# Patient Record
Sex: FEMALE | Race: WHITE | NOT HISPANIC OR LATINO
[De-identification: names, ages, dates, MRNs, and addresses within clinical notes are randomized per-mention and may not be internally consistent; named-entity substitution may affect disease eponyms.]

---

## 2018-01-23 PROBLEM — Z00.00 ENCOUNTER FOR PREVENTIVE HEALTH EXAMINATION: Status: ACTIVE | Noted: 2018-01-23

## 2018-01-30 ENCOUNTER — APPOINTMENT (OUTPATIENT)
Dept: ENDOCRINOLOGY | Facility: CLINIC | Age: 38
End: 2018-01-30
Payer: MEDICAID

## 2018-01-30 VITALS
SYSTOLIC BLOOD PRESSURE: 101 MMHG | HEART RATE: 69 BPM | DIASTOLIC BLOOD PRESSURE: 70 MMHG | WEIGHT: 136 LBS | HEIGHT: 64 IN | BODY MASS INDEX: 23.22 KG/M2

## 2018-01-30 PROCEDURE — 99204 OFFICE O/P NEW MOD 45 MIN: CPT

## 2018-01-30 RX ORDER — THYROID,PORK 81.25 MG
81.25 TABLET ORAL
Refills: 0 | Status: DISCONTINUED | COMMUNITY
End: 2018-01-30

## 2018-02-12 ENCOUNTER — MOBILE ON CALL (OUTPATIENT)
Age: 38
End: 2018-02-12

## 2018-02-15 ENCOUNTER — TRANSCRIPTION ENCOUNTER (OUTPATIENT)
Age: 38
End: 2018-02-15

## 2018-02-23 ENCOUNTER — APPOINTMENT (OUTPATIENT)
Dept: RHEUMATOLOGY | Facility: CLINIC | Age: 38
End: 2018-02-23

## 2018-06-12 ENCOUNTER — RX RENEWAL (OUTPATIENT)
Age: 38
End: 2018-06-12

## 2019-02-07 ENCOUNTER — APPOINTMENT (OUTPATIENT)
Dept: GASTROENTEROLOGY | Facility: CLINIC | Age: 39
End: 2019-02-07

## 2019-03-08 ENCOUNTER — APPOINTMENT (OUTPATIENT)
Dept: GASTROENTEROLOGY | Facility: CLINIC | Age: 39
End: 2019-03-08

## 2019-03-14 ENCOUNTER — MED ADMIN CHARGE (OUTPATIENT)
Age: 39
End: 2019-03-14

## 2019-04-11 ENCOUNTER — APPOINTMENT (OUTPATIENT)
Dept: GASTROENTEROLOGY | Facility: CLINIC | Age: 39
End: 2019-04-11

## 2019-05-01 ENCOUNTER — APPOINTMENT (OUTPATIENT)
Dept: ENDOCRINOLOGY | Facility: CLINIC | Age: 39
End: 2019-05-01

## 2019-06-03 ENCOUNTER — RX RENEWAL (OUTPATIENT)
Age: 39
End: 2019-06-03

## 2019-06-07 ENCOUNTER — APPOINTMENT (OUTPATIENT)
Dept: GASTROENTEROLOGY | Facility: CLINIC | Age: 39
End: 2019-06-07
Payer: MEDICAID

## 2019-06-07 VITALS
BODY MASS INDEX: 23.56 KG/M2 | OXYGEN SATURATION: 99 % | RESPIRATION RATE: 15 BRPM | TEMPERATURE: 98.3 F | SYSTOLIC BLOOD PRESSURE: 106 MMHG | HEART RATE: 60 BPM | DIASTOLIC BLOOD PRESSURE: 60 MMHG | HEIGHT: 64 IN | WEIGHT: 138 LBS

## 2019-06-07 DIAGNOSIS — K63.89 OTHER SPECIFIED DISEASES OF INTESTINE: ICD-10-CM

## 2019-06-07 DIAGNOSIS — R19.5 OTHER FECAL ABNORMALITIES: ICD-10-CM

## 2019-06-07 DIAGNOSIS — K63.5 POLYP OF COLON: ICD-10-CM

## 2019-06-07 DIAGNOSIS — Z87.19 PERSONAL HISTORY OF OTHER DISEASES OF THE DIGESTIVE SYSTEM: ICD-10-CM

## 2019-06-07 DIAGNOSIS — L29.0 PRURITUS ANI: ICD-10-CM

## 2019-06-07 DIAGNOSIS — E73.9 LACTOSE INTOLERANCE, UNSPECIFIED: ICD-10-CM

## 2019-06-07 PROCEDURE — 99204 OFFICE O/P NEW MOD 45 MIN: CPT

## 2019-06-07 PROCEDURE — 99205 OFFICE O/P NEW HI 60 MIN: CPT

## 2019-06-07 RX ORDER — SODIUM SULFATE, POTASSIUM SULFATE, MAGNESIUM SULFATE 17.5; 3.13; 1.6 G/ML; G/ML; G/ML
17.5-3.13-1.6 SOLUTION, CONCENTRATE ORAL
Qty: 1 | Refills: 0 | Status: ACTIVE | COMMUNITY
Start: 2019-06-07 | End: 1900-01-01

## 2019-06-07 NOTE — ASSESSMENT
[FreeTextEntry1] : Chronic loose stools, somewhat improved recently, cannot rule out lingering celiac disease activity, cannot rule out colitis, microscopic colitis\par Likely strong component of food sensitivity, irritable bowel\par History of colon polyps due for surveillance\par Pruritus ani as noted\par \par Plan\par Continue probiotic yogurt\par Consider Imodium daily to help form of bowel movements, decreased leakage and ideally decrease itching\par Indications risks benefits and alternatives to both upper endoscopy and colonoscopy reviewed\par Patient agreeable to examination

## 2019-06-07 NOTE — REASON FOR VISIT
[Consultation] : a consultation visit [FreeTextEntry1] : History of colon adenoma\par History of celiac disease\par Pruritus ani\par Loose stools

## 2019-06-07 NOTE — PHYSICAL EXAM
[General Appearance - Alert] : alert [General Appearance - In No Acute Distress] : in no acute distress [PERRL With Normal Accommodation] : pupils were equal in size, round, and reactive to light [Sclera] : the sclera and conjunctiva were normal [Extraocular Movements] : extraocular movements were intact [Oropharynx] : the oropharynx was normal [Outer Ear] : the ears and nose were normal in appearance [Neck Appearance] : the appearance of the neck was normal [Neck Cervical Mass (___cm)] : no neck mass was observed [Thyroid Diffuse Enlargement] : the thyroid was not enlarged [Jugular Venous Distention Increased] : there was no jugular-venous distention [Thyroid Nodule] : there were no palpable thyroid nodules [Auscultation Breath Sounds / Voice Sounds] : lungs were clear to auscultation bilaterally [Heart Rate And Rhythm] : heart rate was normal and rhythm regular [Murmurs] : no murmurs [Heart Sounds Gallop] : no gallops [Heart Sounds] : normal S1 and S2 [Heart Sounds Pericardial Friction Rub] : no pericardial rub [Bowel Sounds] : normal bowel sounds [Abdomen Soft] : soft [Abdomen Tenderness] : non-tender [Abdomen Mass (___ Cm)] : no abdominal mass palpated [Cervical Lymph Nodes Enlarged Posterior Bilaterally] : posterior cervical [Supraclavicular Lymph Nodes Enlarged Bilaterally] : supraclavicular [Cervical Lymph Nodes Enlarged Anterior Bilaterally] : anterior cervical [Axillary Lymph Nodes Enlarged Bilaterally] : axillary [Femoral Lymph Nodes Enlarged Bilaterally] : femoral [No CVA Tenderness] : no ~M costovertebral angle tenderness [Inguinal Lymph Nodes Enlarged Bilaterally] : inguinal [No Spinal Tenderness] : no spinal tenderness [Abnormal Walk] : normal gait [Musculoskeletal - Swelling] : no joint swelling seen [Nail Clubbing] : no clubbing  or cyanosis of the fingernails [Skin Color & Pigmentation] : normal skin color and pigmentation [Skin Turgor] : normal skin turgor [Motor Tone] : muscle strength and tone were normal [Oriented To Time, Place, And Person] : oriented to person, place, and time [] : no rash [Affect] : the affect was normal [Impaired Insight] : insight and judgment were intact [External Hemorrhoid] : no external hemorrhoids

## 2019-06-07 NOTE — HISTORY OF PRESENT ILLNESS
[de-identified] : 38-year-old female history of celiac disease percent for evaluation.\par Patient diagnosed in her 20s, complaints of bloating, constipation markedly improved following diagnosis and strict gluten-free diet\par \par Last upper endoscopy performed 2015 showed grossly normal-appearing duodenal mucosa, with intraepithelial lymphocytosis. No treatment changes recommended at that time.\par \par Patient had colonoscopy performed at the same time does not examination for evaluation of bleeding, noted to have tubular adenoma.\par \par Patient's most recent gastrointestinal complaints include anal itching for which she has seen several dermatologists, proctologist\par No definitive explanation provided, the patient feels complaints likely related to anal leakage\par States that whenever she goes to wipe, even if she has not just had a bowel movement, there is typically stool on toilet tissue\par Patient denies any fecal soiling of her undergarments\par Patient denies any incontinence\par \par Patient gives history of lactose intolerance\par Also history of small bowel bacterial overgrowth syndrome, no attempt to treat with antibiotics\par \par Patient has used FODMAP diet in the past with some success\par Currently on a Paleo diet, since developing intolerances to many grains, other foods\par \par Weight stable\par Recent improvement of bowel habit with feeding yogurt\par \par Social history nonsmoker, professional musician

## 2019-06-12 ENCOUNTER — MESSAGE (OUTPATIENT)
Age: 39
End: 2019-06-12

## 2019-06-12 RX ORDER — POLYETHYLENE GLYCOL-3350 AND ELECTROLYTES WITH FLAVOR PACK 240; 5.84; 2.98; 6.72; 22.72 G/278.26G; G/278.26G; G/278.26G; G/278.26G; G/278.26G
240 POWDER, FOR SOLUTION ORAL
Qty: 1 | Refills: 0 | Status: ACTIVE | COMMUNITY
Start: 2019-06-12 | End: 1900-01-01

## 2019-07-19 ENCOUNTER — APPOINTMENT (OUTPATIENT)
Dept: ENDOCRINOLOGY | Facility: CLINIC | Age: 39
End: 2019-07-19

## 2019-07-25 ENCOUNTER — APPOINTMENT (OUTPATIENT)
Dept: ENDOCRINOLOGY | Facility: CLINIC | Age: 39
End: 2019-07-25
Payer: MEDICAID

## 2019-07-25 VITALS
DIASTOLIC BLOOD PRESSURE: 72 MMHG | HEART RATE: 84 BPM | WEIGHT: 137 LBS | SYSTOLIC BLOOD PRESSURE: 104 MMHG | BODY MASS INDEX: 23.39 KG/M2 | HEIGHT: 64 IN

## 2019-07-25 PROCEDURE — 99213 OFFICE O/P EST LOW 20 MIN: CPT

## 2019-07-25 RX ORDER — THYROID, PORCINE 15 MG/1
15 TABLET ORAL
Qty: 90 | Refills: 0 | Status: DISCONTINUED | COMMUNITY
Start: 2018-02-08 | End: 2019-07-25

## 2019-08-19 RX ORDER — THYROID, PORCINE 90 MG/1
90 TABLET ORAL
Qty: 90 | Refills: 1 | Status: DISCONTINUED | COMMUNITY
Start: 2018-02-08 | End: 2019-08-19

## 2019-08-19 NOTE — ADDENDUM
[FreeTextEntry1] : 8/19/19 AL\par Pt called having trouble cutting pills. Would like to return to 15mg and 60mg pills. Rx sent.

## 2019-08-19 NOTE — ASSESSMENT
[FreeTextEntry1] : Hypothyroidism (244.9) (E03.9)\par \par Hypothyroidism. TSH is normal with slightly low FT4 and normal FT3 on Mcpherson thyroid 67.5mg daily. We discussed switching to T4 + T3 therapy with Tirosint and Cytomel (both gluten free per the ; would consider starting levothyroxine 100 mcg and liothyronine 10 mcg) in the future, as she will be travelling soon and doesn't know how she will respond to change. She is requesting 90mg tabs so she doesn't have to carry multiple pill bottles and pay 2 co-pays. She has pill cutter and will cut in half and the one half in half. If she has difficulty cutting for accurate dose, she will let me know.\par \par She will fax thyroid US results. \par Provided lab requisition for 3 months from now to recheck TFTS. F/U with Dr Avendano in 3 months.\par

## 2019-08-19 NOTE — HISTORY OF PRESENT ILLNESS
[FreeTextEntry1] : Ms. Tim is a 37 year-old woman with a history of celiac disease and hypothyroidism presenting for f/u of hypothyroidism. She is a pt of Dr Avendano last seen over a year ago.\par \par Hypothyroidism.\par She was diagnosed in 2000.\par Last visit she was prescribed Malone thyroid 75 mg daily. She is only taking 67.5 mg daily as she cuts the 15 mg pill in half. She says if the pill is not cut equally, she can feel effects of a little more medication "wired and trouble sleeping". She takes it in the morning, on an empty stomach, with plain water, and waits 1 hour before eating.\par She brought labs from May: TSH 0.70, FT4 0.7, TT4 5.1, TT3 29, FT3 3.3.\par Current complaints: loose stools, cold and heat intolerance, gained 10lbs last year due to increase in food and lack of exercise, but is now back at baseline. Is basically on paleo diet. Lactose intolerant.\par Previously she was taking WP Thyroid for about 3 years, at the 81.25 mg dose since about September 2017. WP Thyroid had been very difficult to obtain from pharmacies. Prior to that, she was previously on brand-name Synthroid and had subsequently felt much better since the switch to a natural formulation.\par No history of head/neck radiation.\par She had a thyroid US approx year and a half ago. Thinks she remembers thyroid being small. Doesn't recall nodules.\par Her paternal aunt has a history of hypothyroidism.\par \par She takes the following supplements: homocysteine supreme, digestive enzymes, fish oil, probiotics, Vitamin C and D.\par She recently went to GI for f/u on celiac and pruritic ani who recommended probiotic yogurt and daily Imodium.

## 2019-08-19 NOTE — PHYSICAL EXAM
[Alert] : alert [No Acute Distress] : no acute distress [Well Nourished] : well nourished [Well Developed] : well developed [Normal Sclera/Conjunctiva] : normal sclera/conjunctiva [EOMI] : extra ocular movement intact [No Proptosis] : no proptosis [Normal Oropharynx] : the oropharynx was normal [Thyroid Not Enlarged] : the thyroid was not enlarged [No Thyroid Nodules] : there were no palpable thyroid nodules [No Respiratory Distress] : no respiratory distress [No Accessory Muscle Use] : no accessory muscle use [Clear to Auscultation] : lungs were clear to auscultation bilaterally [Normal Rate] : heart rate was normal  [Normal S1, S2] : normal S1 and S2 [Regular Rhythm] : with a regular rhythm [Pedal Pulses Normal] : the pedal pulses are present [No Edema] : there was no peripheral edema [Normal Bowel Sounds] : normal bowel sounds [Not Tender] : non-tender [Soft] : abdomen soft [Not Distended] : not distended [Post Cervical Nodes] : posterior cervical nodes [Anterior Cervical Nodes] : anterior cervical nodes [Axillary Nodes] : axillary nodes [Normal] : normal and non tender [No Spinal Tenderness] : no spinal tenderness [Spine Straight] : spine straight [No Stigmata of Cushings Syndrome] : no stigmata of cushings syndrome [Normal Gait] : normal gait [Normal Strength/Tone] : muscle strength and tone were normal [No Rash] : no rash [No Skin Lesions] : no skin lesions [No Tremors] : no tremors [Oriented x3] : oriented to person, place, and time [Normal Insight/Judgement] : insight and judgment were intact [Normal Affect] : the affect was normal [Normal Mood] : the mood was normal [Acanthosis Nigricans] : no acanthosis nigricans

## 2019-08-27 ENCOUNTER — RX CHANGE (OUTPATIENT)
Age: 39
End: 2019-08-27

## 2019-08-29 ENCOUNTER — TRANSCRIPTION ENCOUNTER (OUTPATIENT)
Age: 39
End: 2019-08-29

## 2020-07-30 ENCOUNTER — APPOINTMENT (OUTPATIENT)
Dept: ENDOCRINOLOGY | Facility: CLINIC | Age: 40
End: 2020-07-30
Payer: MEDICAID

## 2020-07-30 PROCEDURE — 99213 OFFICE O/P EST LOW 20 MIN: CPT | Mod: 95

## 2020-07-30 NOTE — REASON FOR VISIT
[Home] : at home, [unfilled] , at the time of the visit. [Medical Office: ([facility])___] : at the medical office located in  [Hypothyroidism] : hypothyroidism [Follow - Up] : a follow-up visit [Verbal consent obtained from patient] : the patient, [unfilled]

## 2020-07-30 NOTE — PHYSICAL EXAM
[Alert] : alert [Well Nourished] : well nourished [No Acute Distress] : no acute distress [Healthy Appearance] : healthy appearance [Normal Voice/Communication] : normal voice communication

## 2020-08-15 ENCOUNTER — TRANSCRIPTION ENCOUNTER (OUTPATIENT)
Age: 40
End: 2020-08-15

## 2020-08-28 ENCOUNTER — TRANSCRIPTION ENCOUNTER (OUTPATIENT)
Age: 40
End: 2020-08-28

## 2020-08-28 ENCOUNTER — EMERGENCY (EMERGENCY)
Facility: HOSPITAL | Age: 40
LOS: 1 days | Discharge: HOME | End: 2020-08-28
Admitting: EMERGENCY MEDICINE
Payer: MEDICAID

## 2020-08-28 VITALS
HEART RATE: 70 BPM | SYSTOLIC BLOOD PRESSURE: 109 MMHG | WEIGHT: 149.91 LBS | OXYGEN SATURATION: 98 % | HEIGHT: 67 IN | TEMPERATURE: 98 F | DIASTOLIC BLOOD PRESSURE: 74 MMHG | RESPIRATION RATE: 17 BRPM
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
## 2020-08-28 DIAGNOSIS — S61.432A PUNCTURE WOUND WITHOUT FOREIGN BODY OF LEFT HAND, INITIAL ENCOUNTER: ICD-10-CM
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
## 2020-08-28 PROCEDURE — 99284 EMERGENCY DEPT VISIT MOD MDM: CPT

## 2020-08-28 NOTE — ED ADULT NURSE NOTE - NSIMPLEMENTINTERV_GEN_ALL_ED
Implemented All Universal Safety Interventions:  [place] to call system. Call bell, personal items and telephone within reach. Instruct patient to call for assistance. Room bathroom lighting operational. Non-slip footwear when patient is off stretcher. Physically safe environment: no spills, clutter or unnecessary equipment. Stretcher in lowest position, wheels locked, appropriate side rails in place.

## 2020-08-28 NOTE — ED ADULT TRIAGE NOTE - CHIEF COMPLAINT QUOTE
patient walk in with multiple cat scratches to bilateral upper and lower extremities; unsure of any bites; [facility] and bandaged

## 2020-08-28 NOTE — ED ADULT NURSE NOTE - OBJECTIVE STATEMENT
40y female presents to ED c/o animal scratches/bite. Pt states she is cat sitting for the cats of an acquaintance. Tonight cat lunged at her multiple times, pt unclear if she was bit. Sustained lacerations to bilateral arms, legs, and chest. Pt presented to [facility] first and has bandages applied to lacerations. Owner of cat unsure if cat has been vaccinated. Pt not up to date on tetanus vax. A&Ox4.

## 2020-08-29 ENCOUNTER — EMERGENCY (EMERGENCY)
Facility: HOSPITAL | Age: 40
LOS: 1 days | Discharge: HOME | End: 2020-08-29
Attending: EMERGENCY MEDICINE | Admitting: EMERGENCY MEDICINE
Payer: MEDICAID

## 2020-08-29 VITALS
SYSTOLIC BLOOD PRESSURE: 108 MMHG | HEIGHT: 67 IN | WEIGHT: 138.01 LBS | DIASTOLIC BLOOD PRESSURE: 64 MMHG | OXYGEN SATURATION: 96 % | TEMPERATURE: 99 F | RESPIRATION RATE: 16 BRPM | HEART RATE: 94 BPM

## 2020-08-29 VITALS
DIASTOLIC BLOOD PRESSURE: 72 MMHG | OXYGEN SATURATION: 98 % | RESPIRATION RATE: 18 BRPM | SYSTOLIC BLOOD PRESSURE: 119 MMHG | TEMPERATURE: 99 F | HEART RATE: 86 BPM

## 2020-08-29 VITALS
TEMPERATURE: 99 F | OXYGEN SATURATION: 99 % | SYSTOLIC BLOOD PRESSURE: 110 MMHG | RESPIRATION RATE: 18 BRPM | DIASTOLIC BLOOD PRESSURE: 60 MMHG | HEART RATE: 92 BPM

## 2020-08-29 LAB
ALBUMIN SERPL ELPH-MCNC: 4.3 G/DL — SIGNIFICANT CHANGE UP (ref 3.3–5)
ALP SERPL-CCNC: 54 U/L — SIGNIFICANT CHANGE UP (ref 40–120)
ALT FLD-CCNC: 10 U/L — SIGNIFICANT CHANGE UP (ref 10–45)
ANION GAP SERPL CALC-SCNC: 11 MMOL/L — SIGNIFICANT CHANGE UP (ref 5–17)
APTT BLD: 29.1 SEC — SIGNIFICANT CHANGE UP (ref 27.5–35.5)
AST SERPL-CCNC: 18 U/L — SIGNIFICANT CHANGE UP (ref 10–40)
BASOPHILS # BLD AUTO: 0.01 K/UL — SIGNIFICANT CHANGE UP (ref 0–0.2)
BASOPHILS NFR BLD AUTO: 0.1 % — SIGNIFICANT CHANGE UP (ref 0–2)
BILIRUB SERPL-MCNC: 0.6 MG/DL — SIGNIFICANT CHANGE UP (ref 0.2–1.2)
BUN SERPL-MCNC: 5 MG/DL — LOW (ref 7–23)
CALCIUM SERPL-MCNC: 9.4 MG/DL — SIGNIFICANT CHANGE UP (ref 8.4–10.5)
CHLORIDE SERPL-SCNC: 98 MMOL/L — SIGNIFICANT CHANGE UP (ref 96–108)
CO2 SERPL-SCNC: 27 MMOL/L — SIGNIFICANT CHANGE UP (ref 22–31)
CREAT SERPL-MCNC: 0.62 MG/DL — SIGNIFICANT CHANGE UP (ref 0.5–1.3)
EOSINOPHIL # BLD AUTO: 0.01 K/UL — SIGNIFICANT CHANGE UP (ref 0–0.5)
EOSINOPHIL NFR BLD AUTO: 0.1 % — SIGNIFICANT CHANGE UP (ref 0–6)
GLUCOSE SERPL-MCNC: 113 MG/DL — HIGH (ref 70–99)
GRAM STN FLD: SIGNIFICANT CHANGE UP
HCG SERPL-ACNC: <0 MIU/ML — SIGNIFICANT CHANGE UP
HCT VFR BLD CALC: 37.7 % — SIGNIFICANT CHANGE UP (ref 34.5–45)
HGB BLD-MCNC: 12.5 G/DL — SIGNIFICANT CHANGE UP (ref 11.5–15.5)
IMM GRANULOCYTES NFR BLD AUTO: 0.3 % — SIGNIFICANT CHANGE UP (ref 0–1.5)
INR BLD: 1.09 — SIGNIFICANT CHANGE UP (ref 0.88–1.16)
LYMPHOCYTES # BLD AUTO: 1.26 K/UL — SIGNIFICANT CHANGE UP (ref 1–3.3)
LYMPHOCYTES # BLD AUTO: 12.5 % — LOW (ref 13–44)
MCHC RBC-ENTMCNC: 31.7 PG — SIGNIFICANT CHANGE UP (ref 27–34)
MCHC RBC-ENTMCNC: 33.2 GM/DL — SIGNIFICANT CHANGE UP (ref 32–36)
MCV RBC AUTO: 95.7 FL — SIGNIFICANT CHANGE UP (ref 80–100)
MONOCYTES # BLD AUTO: 0.63 K/UL — SIGNIFICANT CHANGE UP (ref 0–0.9)
MONOCYTES NFR BLD AUTO: 6.3 % — SIGNIFICANT CHANGE UP (ref 2–14)
NEUTROPHILS # BLD AUTO: 8.1 K/UL — HIGH (ref 1.8–7.4)
NEUTROPHILS NFR BLD AUTO: 80.7 % — HIGH (ref 43–77)
NRBC # BLD: 0 /100 WBCS — SIGNIFICANT CHANGE UP (ref 0–0)
PLATELET # BLD AUTO: 221 K/UL — SIGNIFICANT CHANGE UP (ref 150–400)
POTASSIUM SERPL-MCNC: 3.7 MMOL/L — SIGNIFICANT CHANGE UP (ref 3.5–5.3)
POTASSIUM SERPL-SCNC: 3.7 MMOL/L — SIGNIFICANT CHANGE UP (ref 3.5–5.3)
PROT SERPL-MCNC: 7.4 G/DL — SIGNIFICANT CHANGE UP (ref 6–8.3)
PROTHROM AB SERPL-ACNC: 13 SEC — SIGNIFICANT CHANGE UP (ref 10.6–13.6)
RBC # BLD: 3.94 M/UL — SIGNIFICANT CHANGE UP (ref 3.8–5.2)
RBC # FLD: 11.8 % — SIGNIFICANT CHANGE UP (ref 10.3–14.5)
SODIUM SERPL-SCNC: 136 MMOL/L — SIGNIFICANT CHANGE UP (ref 135–145)
SPECIMEN SOURCE: SIGNIFICANT CHANGE UP
WBC # BLD: 10.04 K/UL — SIGNIFICANT CHANGE UP (ref 3.8–10.5)
WBC # FLD AUTO: 10.04 K/UL — SIGNIFICANT CHANGE UP (ref 3.8–10.5)

## 2020-08-29 PROCEDURE — 96374 THER/PROPH/DIAG INJ IV PUSH: CPT

## 2020-08-29 PROCEDURE — 73130 X-RAY EXAM OF HAND: CPT | Mod: 26,LT

## 2020-08-29 PROCEDURE — 99284 EMERGENCY DEPT VISIT MOD MDM: CPT | Mod: 25

## 2020-08-29 PROCEDURE — 86901 BLOOD TYPING SEROLOGIC RH(D): CPT

## 2020-08-29 PROCEDURE — 86850 RBC ANTIBODY SCREEN: CPT

## 2020-08-29 PROCEDURE — 36415 COLL VENOUS BLD VENIPUNCTURE: CPT

## 2020-08-29 PROCEDURE — 73590 X-RAY EXAM OF LOWER LEG: CPT | Mod: 26,LT

## 2020-08-29 PROCEDURE — 85730 THROMBOPLASTIN TIME PARTIAL: CPT

## 2020-08-29 PROCEDURE — 80053 COMPREHEN METABOLIC PANEL: CPT

## 2020-08-29 PROCEDURE — 73060 X-RAY EXAM OF HUMERUS: CPT

## 2020-08-29 PROCEDURE — 85610 PROTHROMBIN TIME: CPT

## 2020-08-29 PROCEDURE — 99284 EMERGENCY DEPT VISIT MOD MDM: CPT

## 2020-08-29 PROCEDURE — 73060 X-RAY EXAM OF HUMERUS: CPT | Mod: 26,RT

## 2020-08-29 PROCEDURE — 87070 CULTURE OTHR SPECIMN AEROBIC: CPT

## 2020-08-29 PROCEDURE — 73130 X-RAY EXAM OF HAND: CPT

## 2020-08-29 PROCEDURE — 87205 SMEAR GRAM STAIN: CPT

## 2020-08-29 PROCEDURE — 73590 X-RAY EXAM OF LOWER LEG: CPT

## 2020-08-29 PROCEDURE — 85025 COMPLETE CBC W/AUTO DIFF WBC: CPT

## 2020-08-29 PROCEDURE — 87184 SC STD DISK METHOD PER PLATE: CPT

## 2020-08-29 PROCEDURE — 84702 CHORIONIC GONADOTROPIN TEST: CPT

## 2020-08-29 RX ORDER — AMPICILLIN SODIUM AND SULBACTAM SODIUM 250; 125 MG/ML; MG/ML
3 INJECTION, POWDER, FOR SUSPENSION INTRAMUSCULAR; INTRAVENOUS ONCE
Refills: 0 | Status: COMPLETED | OUTPATIENT
Start: 2020-08-29 | End: 2020-08-29

## 2020-08-29 RX ORDER — TETANUS TOXOID, REDUCED DIPHTHERIA TOXOID AND ACELLULAR PERTUSSIS VACCINE, ADSORBED 5; 2.5; 8; 8; 2.5 [IU]/.5ML; [IU]/.5ML; UG/.5ML; UG/.5ML; UG/.5ML
0.5 SUSPENSION INTRAMUSCULAR ONCE
Refills: 0 | Status: COMPLETED | OUTPATIENT
Start: 2020-08-29 | End: 2020-08-29

## 2020-08-29 RX ORDER — OXYCODONE AND ACETAMINOPHEN 5; 325 MG/1; MG/1
1 TABLET ORAL ONCE
Refills: 0 | Status: DISCONTINUED | OUTPATIENT
Start: 2020-08-29 | End: 2020-08-29

## 2020-08-29 RX ORDER — HUMAN RABIES VIRUS IMMUNE GLOBULIN 150 [IU]/ML
1350 INJECTION, SOLUTION INTRAMUSCULAR ONCE
Refills: 0 | Status: COMPLETED | OUTPATIENT
Start: 2020-08-29 | End: 2020-08-29

## 2020-08-29 RX ORDER — RABIES VACC, HUMAN DIPLOID/PF 2.5 UNIT
1 VIAL (EA) INTRAMUSCULAR ONCE
Refills: 0 | Status: COMPLETED | OUTPATIENT
Start: 2020-08-29 | End: 2020-08-29

## 2020-08-29 RX ADMIN — HUMAN RABIES VIRUS IMMUNE GLOBULIN 1350 UNIT(S): 150 INJECTION, SOLUTION INTRAMUSCULAR at 02:30

## 2020-08-29 RX ADMIN — Medication 1 MILLILITER(S): at 00:50

## 2020-08-29 RX ADMIN — AMPICILLIN SODIUM AND SULBACTAM SODIUM 200 GRAM(S): 250; 125 INJECTION, POWDER, FOR SUSPENSION INTRAMUSCULAR; INTRAVENOUS at 06:57

## 2020-08-29 RX ADMIN — OXYCODONE AND ACETAMINOPHEN 1 TABLET(S): 5; 325 TABLET ORAL at 05:00

## 2020-08-29 RX ADMIN — OXYCODONE AND ACETAMINOPHEN 1 TABLET(S): 5; 325 TABLET ORAL at 08:03

## 2020-08-29 RX ADMIN — AMPICILLIN SODIUM AND SULBACTAM SODIUM 200 GRAM(S): 250; 125 INJECTION, POWDER, FOR SUSPENSION INTRAMUSCULAR; INTRAVENOUS at 14:00

## 2020-08-29 RX ADMIN — Medication 0.5 MILLIGRAM(S): at 00:49

## 2020-08-29 RX ADMIN — OXYCODONE AND ACETAMINOPHEN 1 TABLET(S): 5; 325 TABLET ORAL at 14:03

## 2020-08-29 RX ADMIN — OXYCODONE AND ACETAMINOPHEN 1 TABLET(S): 5; 325 TABLET ORAL at 15:03

## 2020-08-29 RX ADMIN — AMPICILLIN SODIUM AND SULBACTAM SODIUM 200 GRAM(S): 250; 125 INJECTION, POWDER, FOR SUSPENSION INTRAMUSCULAR; INTRAVENOUS at 00:51

## 2020-08-29 RX ADMIN — Medication 1 MILLIGRAM(S): at 02:23

## 2020-08-29 RX ADMIN — TETANUS TOXOID, REDUCED DIPHTHERIA TOXOID AND ACELLULAR PERTUSSIS VACCINE, ADSORBED 0.5 MILLILITER(S): 5; 2.5; 8; 8; 2.5 SUSPENSION INTRAMUSCULAR at 00:51

## 2020-08-29 NOTE — ED PROVIDER NOTE - CARE PROVIDER_API CALL
Emiliano Mcfarlane  PLASTIC SURGERY  [address] [place] [address]  Phone: [phone]  Fax: [phone]  Follow Up Time: Urgent

## 2020-08-29 NOTE — ED PROCEDURE NOTE - PROCEDURE ADDITIONAL DETAILS
Patient will cat scratches and puncture wounds all over body. multiple abrasions/scratches. All cleansed with povidone iodine and dressed  One deep punture wound left hand base on fourth digit on dorsal aspect of hand lateral to MCP joint. Extensively cleansed,irrigated and cleaned. Probed for FB.   right posterior mild calf puncture wound extensively cleansed and irrigated. Probed and explored for FB.   All wound extensively cleaned with povidone iodine and then sterile water. No signs of FB.

## 2020-08-29 NOTE — ED PROVIDER NOTE - OBJECTIVE STATEMENT
Patient is a 40 year old female with PMH of hypothyroidism presents today without wound on her left hand, arms, and legs from being attacked by a cat last night. Pt was cat sitting for a friend when the cat attacked her. She was originally seen at [facility] and given two doses of Unison over 6 hour period. She was referred to Dr. Mcfarlane in plastics who sent patient to ER for hand surgeon for concern of puncture wound just proximal to left second finger with swelling. Tetanus was updated. Pt denies fever or chills, numbness, and tingling.

## 2020-08-29 NOTE — ED ADULT NURSE NOTE - NSIMPLEMENTINTERV_GEN_ALL_ED
Implemented All Universal Safety Interventions:  [place] to call system. Call bell, personal items and telephone within reach. Instruct patient to call for assistance. Room bathroom lighting operational. Non-slip footwear when patient is off stretcher. Physically safe environment: no spills, clutter or unnecessary equipment. Stretcher in lowest position, wheels locked, appropriate side rails in place.

## 2020-08-29 NOTE — ED ADULT NURSE NOTE - OBJECTIVE STATEMENT
Pt is a 40y female, was seen at [facility] s/p being attacked by a cat. Pt was sent to [facility] ED for further evaluation and to see a hand specialist.

## 2020-08-29 NOTE — ED ADULT NURSE NOTE - CHIEF COMPLAINT QUOTE
pt states " I was sent here by MD Mcfarlane to see a hand specialist, Dr Vazquez, also they told me i'm starting to have cellulitis in my leg". pt endorsed ' she was attack by a cat, sustained multiple lacerations to arms, legs, back.

## 2020-08-29 NOTE — ED ADULT TRIAGE NOTE - CHIEF COMPLAINT QUOTE
pt states " I was sent here by MD Mcfarlane to see a hand specialist, Dr Vazquez". pt endorsed ' she was attack by a cat, sustained multiple lacerations to arms, legs, back. pt states " I was sent here by MD Mcfarlane to see a hand specialist, Dr Vazquez, also they told me i'm starting to have cellulitis in my leg". pt endorsed ' she was attack by a cat, sustained multiple lacerations to arms, legs, back.

## 2020-08-29 NOTE — ED PROVIDER NOTE - ATTENDING CONTRIBUTION TO CARE
Patient is a 40 year old female with PMH of hypothyroidism presents today without wound on her left hand, arms, and legs from being attacked by a cat last night. Pt was cat sitting for a friend when the cat attacked her. She was originally seen at [facility] and given two doses of Unison over 6 hour period. She was referred to Dr. Mcfarlane in plastics who sent patient to ER for hand surgeon for concern of puncture wound just proximal to left second finger with swelling. Tetanus was updated. Pt denies fever or chills, numbness, and tingling.    Agree with HPI and PE as documented by PA    Pt with multiple abrasions and cat bite wounds to extremities  No gas on xrays  Hand wound addressed by plastics hand in ED - discharged on antibiotics

## 2020-08-29 NOTE — ED ADULT NURSE NOTE - CAS EDN DISCHARGE ASSESSMENT
The patient is a 74y Female complaining of other (specify).
Alert and oriented to person, place and time

## 2020-08-29 NOTE — ED PROVIDER NOTE - OBJECTIVE STATEMENT
41 y/o female with hx of crohns now here s/p being attacked multiple times by a Cat. Patient states she was house sitting for a cat of a friend and the cat attacked her unprovoked. Patients friend called and states ct does not usually attack anyone and not to that extent. Patients friend unsure if Cat has rabies vaccinations. Patient denies chest pain,nausea,vomiting,diarrhea,fevers,chills,sob,trauma,loc. Patient appears well NAD.

## 2020-08-29 NOTE — CONSULT NOTE ADULT - SUBJECTIVE AND OBJECTIVE BOX
41yo RHD female who was babysitting a cat for an aquaintance yesterday night when the cat became aggressive and bit and scratched her. The neighbors heard her having problems and brought her to an urgent care in [place]. Prior to arriving at the urgent care she did try to clean her scratches out with hydrogen peroxide. She was then brought to the [facility] ED where she was given IV abx and advised to follow up with Plastic surgeon Emiliano luna. Dr. Luna had then referred her to the Ed for hand surgery evaluation. She denies any fevers or chills. 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 Laceration present in distal to her index finger MCP and ulnar to it. Laceration measures 1cm in size, sensation intact in all distributions and all fingers. Edema also present in the volar aspect of her MCP. No erythema in her volar aspect. No tenderness along the flexor tendon sheath. Pt with FROM of all fingers and wrist. Pt with full extension of her MCP. fingers pink and warm. No tenderness to palpation in her wrist, no cellulitis in her wrist or proximally    Xray of the left hand: Not interpreted formally by radiology however no evidence of fracture or dislocation, no evidence of foreign body                          12.5   10.04 )-----------( 221      ( 29 Aug 2020 13:25 )             37.7   08-29    136  |  98  |  5<L>  ----------------------------<  113<H>  3.7   |  27  |  0.62    Ca    9.4      29 Aug 2020 13:25    TPro  7.4  /  Alb  4.3  /  TBili  0.6  /  DBili  x   /  AST  18  /  ALT  10  /  AlkPhos  54  08-29

## 2020-08-29 NOTE — ED PROVIDER NOTE - PATIENT PORTAL LINK FT
You can access the FollowMyHealth Patient Portal offered by [facility] by registering at the following website: [email or web address]. By joining [facility]’s FollowMyHealth portal, you will also be able to view your health information using other applications (apps) compatible with our system.

## 2020-08-29 NOTE — ED PROVIDER NOTE - NSFOLLOWUPINSTRUCTIONS_ED_ALL_ED_FT
GO TO [address] [place] [address]  8 AM Dr. Mcfarlane  at 11 AM THIS MORNING         Follow up with your primary care doctor or clinics listed below if you do not have a doctor  Return immediately for any new or worsening symptoms or any new concerns  Animal Bite, Adult  Animal bites range from mild to serious. An animal bite can result in any of these injuries:  A scratch.A deep, open cut.A puncture of the skin.A crush injury.Tearing away of the skin or a body part.A bone injury.A small bite from a house pet is usually less serious than a bite from a stray or wild animal, such as a raccoon, davis, skunk, or bat. That is because stray and wild animals have a higher risk of carrying a serious infection called rabies, which can be passed to humans through a bite.  What increases the risk?  You are more likely to be bitten by an animal if:  You are around unfamiliar pets.You disturb an animal when it is eating, sleeping, or caring for its babies.You are outdoors in a place where small, wild animals roam freely.What are the signs or symptoms?  Common symptoms of an animal bite include:  Pain.Bleeding.Swelling.Bruising.How is this diagnosed?  This condition may be diagnosed based on a physical exam and medical history. Your health care provider will examine your wound and ask for details about the animal and how the bite happened. You may also have tests, such as:  Blood tests to check for infection.X-rays to check for damage to bones or joints.Taking a fluid sample from your wound and checking it for infection (culture test).How is this treated?  Treatment varies depending on the type of animal, where the bite is on your body, and your medical history. Treatment may include:  Caring for the wound. This often includes cleaning the wound, rinsing out (flushing) the wound with saline solution, and applying a bandage (dressing). In some cases, the wound may be closed with stitches (sutures), staples, skin glue, or adhesive strips.Antibiotic medicine to prevent or treat infection. This medicine may be prescribed in pill or ointment form. If the bite area becomes infected, the medicine may be given through an IV.A tetanus shot to prevent tetanus infection.Rabies treatment to prevent rabies infection. This will be done if the animal could have rabies.Surgery. This may be done if a bite gets infected or if there is damage that needs to be repaired.Follow these instructions at home:  Wound care        Follow instructions from your health care provider about how to take care of your wound. Make sure you:  Wash your hands with soap and water before you change your dressing. If soap and water are not available, use hand .Change your dressing as told by your health care provider.Leave sutures, skin glue, or adhesive strips in place. These skin closures may need to stay in place for 2 weeks or longer. If adhesive strip edges start to loosen and curl up, you may trim the loose edges. Do not remove adhesive strips completely unless your health care provider tells you to do that.Check your wound every day for signs of infection. Check for:  More redness, swelling, or pain.More fluid or blood.Warmth.Pus or a bad smell.Medicines     Take or apply over-the-counter and prescription medicines only as told by your health care provider.If you were prescribed an antibiotic, take or apply it as told by your health care provider. Do not stop using the antibiotic even if your condition improves.General instructions        Keep the injured area raised (elevated) above the level of your heart while you are sitting or lying down, if this is possible.If directed, put ice on the injured area.  Put ice in a plastic bag.Place a towel between your skin and the bag.Leave the ice on for 20 minutes, 2–3 times per day.Keep all follow-up visits as told by your health care provider. This is important.Contact a health care provider if:  You have more redness, swelling, or pain around your wound.Your wound feels warm to the touch.You have a fever or chills.You have a general feeling of sickness (malaise).You feel nauseous or you vomit.You have pain that does not get better.Get help right away if:  You have a red streak that leads away from your wound.You have non-clear fluid or more blood coming from your wound.There is pus or a bad smell coming from your wound.You have trouble moving your injured area.You have numbness or tingling that extends beyond the wound.Summary  Animal bites can range from mild to serious. An animal bite can cause a scratch on the skin, a deep open cut, a puncture of the skin, a crush injury, tearing away of the skin or a body part, or a bone injury.Your health care provider will examine your wound and ask for details about the animal and how the bite happened.You may also have tests such as a blood test, X-ray, or testing of a fluid sample from your wound (culture test).Treatment may include wound care, antibiotic medicine, a tetanus shot, and rabies treatment if the animal could have rabies.This information is not intended to replace advice given to you by your health care provider. Make sure you discuss any questions you have with your health care provider. Return on August 31st, september 4th ,September 11th for rabies vaccine     GO TO [address] [place] [address]  8 AM Dr. Mcfarlane  at 11 AM THIS MORNING         Follow up with your primary care doctor or clinics listed below if you do not have a doctor  Return immediately for any new or worsening symptoms or any new concerns  Animal Bite, Adult  Animal bites range from mild to serious. An animal bite can result in any of these injuries:  A scratch.A deep, open cut.A puncture of the skin.A crush injury.Tearing away of the skin or a body part.A bone injury.A small bite from a house pet is usually less serious than a bite from a stray or wild animal, such as a raccoon, davis, skunk, or bat. That is because stray and wild animals have a higher risk of carrying a serious infection called rabies, which can be passed to humans through a bite.  What increases the risk?  You are more likely to be bitten by an animal if:  You are around unfamiliar pets.You disturb an animal when it is eating, sleeping, or caring for its babies.You are outdoors in a place where small, wild animals roam freely.What are the signs or symptoms?  Common symptoms of an animal bite include:  Pain.Bleeding.Swelling.Bruising.How is this diagnosed?  This condition may be diagnosed based on a physical exam and medical history. Your health care provider will examine your wound and ask for details about the animal and how the bite happened. You may also have tests, such as:  Blood tests to check for infection.X-rays to check for damage to bones or joints.Taking a fluid sample from your wound and checking it for infection (culture test).How is this treated?  Treatment varies depending on the type of animal, where the bite is on your body, and your medical history. Treatment may include:  Caring for the wound. This often includes cleaning the wound, rinsing out (flushing) the wound with saline solution, and applying a bandage (dressing). In some cases, the wound may be closed with stitches (sutures), staples, skin glue, or adhesive strips.Antibiotic medicine to prevent or treat infection. This medicine may be prescribed in pill or ointment form. If the bite area becomes infected, the medicine may be given through an IV.A tetanus shot to prevent tetanus infection.Rabies treatment to prevent rabies infection. This will be done if the animal could have rabies.Surgery. This may be done if a bite gets infected or if there is damage that needs to be repaired.Follow these instructions at home:  Wound care        Follow instructions from your health care provider about how to take care of your wound. Make sure you:  Wash your hands with soap and water before you change your dressing. If soap and water are not available, use hand .Change your dressing as told by your health care provider.Leave sutures, skin glue, or adhesive strips in place. These skin closures may need to stay in place for 2 weeks or longer. If adhesive strip edges start to loosen and curl up, you may trim the loose edges. Do not remove adhesive strips completely unless your health care provider tells you to do that.Check your wound every day for signs of infection. Check for:  More redness, swelling, or pain.More fluid or blood.Warmth.Pus or a bad smell.Medicines     Take or apply over-the-counter and prescription medicines only as told by your health care provider.If you were prescribed an antibiotic, take or apply it as told by your health care provider. Do not stop using the antibiotic even if your condition improves.General instructions        Keep the injured area raised (elevated) above the level of your heart while you are sitting or lying down, if this is possible.If directed, put ice on the injured area.  Put ice in a plastic bag.Place a towel between your skin and the bag.Leave the ice on for 20 minutes, 2–3 times per day.Keep all follow-up visits as told by your health care provider. This is important.Contact a health care provider if:  You have more redness, swelling, or pain around your wound.Your wound feels warm to the touch.You have a fever or chills.You have a general feeling of sickness (malaise).You feel nauseous or you vomit.You have pain that does not get better.Get help right away if:  You have a red streak that leads away from your wound.You have non-clear fluid or more blood coming from your wound.There is pus or a bad smell coming from your wound.You have trouble moving your injured area.You have numbness or tingling that extends beyond the wound.Summary  Animal bites can range from mild to serious. An animal bite can cause a scratch on the skin, a deep open cut, a puncture of the skin, a crush injury, tearing away of the skin or a body part, or a bone injury.Your health care provider will examine your wound and ask for details about the animal and how the bite happened.You may also have tests such as a blood test, X-ray, or testing of a fluid sample from your wound (culture test).Treatment may include wound care, antibiotic medicine, a tetanus shot, and rabies treatment if the animal could have rabies.This information is not intended to replace advice given to you by your health care provider. Make sure you discuss any questions you have with your health care provider.

## 2020-08-29 NOTE — ED PROVIDER NOTE - SKIN, MLM
multiple scratches to arms and legs b/l, mild swelling and erythema to posterior left calf, ecchymosis to right upper arm with surrounding scratches. Puncture would to the dorsum of left hand just proximal to the second phalanx, sensation intact, with swelling.

## 2020-08-29 NOTE — ED ADULT NURSE REASSESSMENT NOTE - NS ED NURSE REASSESS COMMENT FT1
Pt okayed for discharge. After ambulating to the bathroom, pt states pain too difficult when walking to make it to pharmacy. MAHOGANY Mcfarlane aware. Delay in discharge. VSS.
Pt resting in stretcher. Pending repeat abx and consultation with plastics in AM. Safety and comfort measures maintained.
Received patient from KYRIE Hernández. Awaiting repeat ABX and plastic consult in AM, nad, respirations even bilaterally, assessments ongoing.

## 2020-08-29 NOTE — ED PROVIDER NOTE - PROGRESS NOTE DETAILS
pt seen by Dr. Vazquez, I&D done with no pus, stable for d/c on augmentin, wound care instructions given, to f/u with Dr. Vazquez, return to ED if sx worsen

## 2020-08-29 NOTE — ED PROVIDER NOTE - CLINICAL SUMMARY MEDICAL DECISION MAKING FREE TEXT BOX
Patient is a 40 year old female presents today for hand evaluation after cat bites/scratch wounds. Tetanus up to date. Patient is due to receive another dose of Unison, IV placed and Unison ordered. X-ray negative for foreign body. Hand surgeon Dr. Vazquez was called and will evaluate patient in ER, disposition pending. Will f/u with hand surgery recommendation.

## 2020-08-29 NOTE — ED PROVIDER NOTE - NSFOLLOWUPINSTRUCTIONS_ED_ALL_ED_FT
Please follow up with Dr. Vazquez, return to ED on 9/1, 9/5, 9/12 for repeat rabies vaccines.    Animal Bite    WHAT YOU NEED TO KNOW:    Animal bite injuries range from shallow cuts to deep, life-threatening wounds. An animal can cut or puncture the skin when it bites. Your skin may be torn from your body. Your skin may swell or bruise even if the bite does not break the skin. Animal bites occur more often on the hands, arms, legs, and face. Bites from dogs and cats are the most common injuries.    DISCHARGE INSTRUCTIONS:    Return to the emergency department if:     You have a fever.      Your wound is red, swollen, and draining pus.      You see red streaks on the skin around the wound.      You can no longer move the bitten area.      Your heartbeat and breathing are much faster than usual.      You feel dizzy and confused.    Contact your healthcare provider if:     Your pain does not get better, even after you take pain medicine.      You have nightmares or flashbacks about the animal bite.       You have questions or concerns about your condition or care.    Medicines: You may need any of the following:     Antibiotics prevent or treat a bacterial infection.      Prescription pain medicine may be given. Ask how to take this medicine safely.      A tetanus vaccine may be needed to prevent tetanus. Tetanus is a life-threatening bacterial infection that affects the nerves and muscles. The bacteria can be spread through animal bites.       A rabies vaccine may be needed to prevent rabies. Rabies is a life-threatening viral infection. The virus can be spread through animal bites.      Take your medicine as directed. Contact your healthcare provider if you think your medicine is not helping or if you have side effects. Tell him of her if you are allergic to any medicine. Keep a list of the medicines, vitamins, and herbs you take. Include the amounts, and when and why you take them. Bring the list or the pill bottles to follow-up visits. Carry your medicine list with you in case of an emergency.    Follow up with your healthcare provider in 1 to 2 days: You may need to return to have your stitches removed. Write down your questions so you remember to ask them during your visits.    Self-care:     Apply antibiotic ointment as directed. This helps prevent infection in minor skin wounds. It is available without a doctor's order.      Keep the wound clean and covered. Wash the wound every day with soap and water or germ-killing cleanser. Ask your healthcare provider about the kinds of bandages to use.       Apply ice on your wound. Ice helps decrease swelling and pain. Ice may also help prevent tissue damage. Use an ice pack, or put crushed ice in a plastic bag. Cover it with a towel and place it on your wound for 15 to 20 minutes every hour or as directed.      Elevate the wound area. Raise your wound above the level of your heart as often as you can. This will help decrease swelling and pain. Prop your wound on pillows or blankets to keep it elevated comfortably.     Prevent another animal bite:     Learn to recognize the signs of a scared or angry pet. Avoid quick, sudden movements.      Do not step between animals that are fighting.      Do not leave a pet alone with a young child.      Do not disturb an animal while it eats, sleeps, or cares for its young.      Do not approach an animal you do not know, especially one that is tied up or caged.      Stay away from animals that seem sick or act strangely.      Do not feed or capture wild animals.

## 2020-08-29 NOTE — ED PROVIDER NOTE - CARE PROVIDER_API CALL
Indra Vazquez)  Surgery  [address], [address]  [place] [address]  Phone: [phone]  Fax: [phone]  Follow Up Time:

## 2020-08-29 NOTE — ED PROVIDER NOTE - PATIENT PORTAL LINK FT
You can access the FollowMyHealth Patient Portal offered by [facility] by registering at the following website: [email or web address]. By joining [employer]’s FollowMyHealth portal, you will also be able to view your health information using other applications (apps) compatible with our system.

## 2020-08-29 NOTE — ED PROVIDER NOTE - CLINICAL SUMMARY MEDICAL DECISION MAKING FREE TEXT BOX
39 y/o female here s/p being attacked by friends cat with extensive abrasions/scratches and puncture wounds throughout body.   two 0.5 cm deep puncture wound on base of left fourth digit at the base lateral to joint and one on the left posterior mid calf.   Patient wounds extensively irrigated,probed, and cleaned.   Patient will be covered with unasyn for two doses and will see plastics this morning at eleven AM. Patient understands and sgrees with plan.  Opted for rabies prophylaxis

## 2020-08-29 NOTE — ED PROVIDER NOTE - PHYSICAL EXAMINATION
Patient has extensive cat scratches on all extremities, chest, back  1 puncture wound on her left hand on the dorsal aspect at the base of the fourth digit lateral to the joint 0.5 cm deep  1 puncture wound 0.5 cm deep on posterior mid calf.

## 2020-08-29 NOTE — CONSULT NOTE ADULT - ASSESSMENT
40 RHD s/p bite an scratches to her left hand. She did get 2 doses of IV abx while in [facility]. She was also given a tetanus vaccination. I and D was performed today and cultures of her dorsal wound were obtained. She was advised to take po abx  - she will remove the packing tomorrow and start hydrogen peroxide soaks to her left hand  - She will apply neosporin to her wounds daily  - follow up with me in the office on Monday  - she was instructed on signs and symptoms to look out for and if she should develop lymphangitis she should come back to the ED for admission and IV abx  - call with questions [phone] 40 RHD s/p bite an scratches to her left hand. She did get 2 doses of IV abx while in [facility]. She was also given a tetanus vaccination. I and D was performed today and cultures of her dorsal wound were obtained. She was advised to take po abx  - she will remove the packing tomorrow and start hydrogen peroxide soaks to her left hand  - She will apply neosporin to her wounds daily  - follow up with me in the office on Monday  - she was instructed on signs and symptoms to look out for and if she should develop lymphangitis she should come back to the ED for admission and IV abx  - call with questions [phone]      dictation # 40 RHD s/p bite an scratches to her left hand. She did get 2 doses of IV abx while in [facility]. She was also given a tetanus vaccination. I and D was performed today and cultures of her dorsal wound were obtained. She was advised to take po abx  - she will remove the packing tomorrow and start hydrogen peroxide soaks to her left hand  - She will apply neosporin to her wounds daily  - follow up with me in the office on Monday  - she was instructed on signs and symptoms to look out for and if she should develop lymphangitis she should come back to the ED for admission and IV abx  - call with questions [phone]      dictation # [id number]

## 2020-08-30 ENCOUNTER — EMERGENCY (EMERGENCY)
Facility: HOSPITAL | Age: 40
LOS: 1 days | Discharge: HOME | End: 2020-08-30
Attending: EMERGENCY MEDICINE | Admitting: EMERGENCY MEDICINE
Payer: MEDICAID

## 2020-08-30 VITALS
HEART RATE: 73 BPM | HEIGHT: 67 IN | SYSTOLIC BLOOD PRESSURE: 120 MMHG | DIASTOLIC BLOOD PRESSURE: 78 MMHG | TEMPERATURE: 98 F | OXYGEN SATURATION: 100 % | RESPIRATION RATE: 18 BRPM
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
## 2020-08-30 DIAGNOSIS — L03.116 CELLULITIS OF LEFT LOWER LIMB: ICD-10-CM
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
## 2020-08-30 DIAGNOSIS — S61.452A OPEN BITE OF LEFT HAND, INITIAL ENCOUNTER: ICD-10-CM

## 2020-08-30 DIAGNOSIS — S60.512A ABRASION OF LEFT HAND, INITIAL ENCOUNTER: ICD-10-CM

## 2020-08-30 DIAGNOSIS — L03.114 CELLULITIS OF LEFT UPPER LIMB: ICD-10-CM

## 2020-08-30 DIAGNOSIS — S80.811A ABRASION, RIGHT LOWER LEG, INITIAL ENCOUNTER: ICD-10-CM

## 2020-08-30 DIAGNOSIS — Z91.018 ALLERGY TO OTHER FOODS: ICD-10-CM

## 2020-08-30 DIAGNOSIS — S80.812A ABRASION, LEFT LOWER LEG, INITIAL ENCOUNTER: ICD-10-CM

## 2020-08-30 DIAGNOSIS — Z91.011 ALLERGY TO MILK PRODUCTS: ICD-10-CM
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
## 2020-08-30 DIAGNOSIS — S60.511A ABRASION OF RIGHT HAND, INITIAL ENCOUNTER: ICD-10-CM

## 2020-08-30 PROCEDURE — 99284 EMERGENCY DEPT VISIT MOD MDM: CPT | Mod: 25

## 2020-08-30 PROCEDURE — 99284 EMERGENCY DEPT VISIT MOD MDM: CPT

## 2020-08-30 PROCEDURE — 96365 THER/PROPH/DIAG IV INF INIT: CPT

## 2020-08-30 RX ORDER — AMPICILLIN SODIUM AND SULBACTAM SODIUM 250; 125 MG/ML; MG/ML
3 INJECTION, POWDER, FOR SUSPENSION INTRAMUSCULAR; INTRAVENOUS ONCE
Refills: 0 | Status: COMPLETED | OUTPATIENT
Start: 2020-08-30 | End: 2020-08-30

## 2020-08-30 RX ADMIN — AMPICILLIN SODIUM AND SULBACTAM SODIUM 200 GRAM(S): 250; 125 INJECTION, POWDER, FOR SUSPENSION INTRAMUSCULAR; INTRAVENOUS at 22:01

## 2020-08-30 RX ADMIN — AMPICILLIN SODIUM AND SULBACTAM SODIUM 3 GRAM(S): 250; 125 INJECTION, POWDER, FOR SUSPENSION INTRAMUSCULAR; INTRAVENOUS at 22:40

## 2020-08-30 NOTE — ED PROVIDER NOTE - PATIENT PORTAL LINK FT
You can access the FollowMyHealth Patient Portal offered by [facility] by registering at the following website: [email or web address]. By joining [employer]’s FollowMyHealth portal, you will also be able to view your health information using other applications (apps) compatible with our system.

## 2020-08-30 NOTE — ED ADULT NURSE NOTE - NEURO ASSESSMENT
[FreeTextEntry1] : The patient is a 47 yo M w/ a PMH of DM I, retinopathy, peripheral neuropathy, HTN, hyperlipidemia presenting for routine f/u.\par \par # DM\par HbA1c 11.7\par C/w Tresiba and admelog refusing insulin pump.ran out of all meds try for continuous glucose monitor.\par Monitor FS qAC, HS.\par F/u HbA1c, lipid panel, TSH, CBC, CMP, microalb/Cr\par F/u podiatry, ophtho\par \par # HTN\par C/w irbesartan\par \par Patient counseled on importance of adherence to medication, finger stick checks.\par \par RTC 6 months or sooner PRN [Carbohydrate Consistent Diet] : carbohydrate consistent diet [Diabetes Foot Care] : diabetes foot care [Long Term Vascular Complications] : long term vascular complications of diabetes [Importance of Diet and Exercise] : importance of diet and exercise to improve glycemic control, achieve weight loss and improve cardiovascular health [Action and use of Insulin] : action and use of short and long-acting insulin - - -

## 2020-08-30 NOTE — ED PROVIDER NOTE - NSFOLLOWUPINSTRUCTIONS_ED_ALL_ED_FT
Cellulitis, Adult     Cellulitis is a skin infection. The infected area is usually warm, red, swollen, and tender. This condition occurs most often in the arms and lower legs. The infection can travel to the muscles, blood, and underlying tissue and become serious. It is very important to get treated for this condition.  What are the causes?  Cellulitis is caused by bacteria. The bacteria enter through a break in the skin, such as a cut, burn, insect bite, open sore, or crack.  What increases the risk?  This condition is more likely to occur in people who:  Have a weak body defense system (immune system).Have open wounds on the skin, such as cuts, burns, bites, and scrapes. Bacteria can enter the body through these open wounds.Are older than 60 years of age.Have diabetes.Have a type of long-lasting (chronic) liver disease (cirrhosis) or kidney disease.Are obese.Have a skin condition such as:  Itchy rash (eczema).Slow movement of blood in the veins (venous stasis).Fluid buildup below the skin (edema).Have had radiation therapy.Use IV drugs.What are the signs or symptoms?  Symptoms of this condition include:  Redness, streaking, or spotting on the skin.Swollen area of the skin.Tenderness or pain when an area of the skin is touched.Warm skin.A fever.Chills.Blisters.How is this diagnosed?  This condition is diagnosed based on a medical history and physical exam. You may also have tests, including:  Blood tests.Imaging tests.How is this treated?  Treatment for this condition may include:  Medicines, such as antibiotic medicines or medicines to treat allergies (antihistamines).Supportive care, such as rest and application of cold or warm cloths (compresses) to the skin.Hospital care, if the condition is severe.The infection usually starts to get better within 1–2 days of treatment.  Follow these instructions at home:     Medicines     Take over-the-counter and prescription medicines only as told by your health care provider.If you were prescribed an antibiotic medicine, take it as told by your health care provider. Do not stop taking the antibiotic even if you start to feel better.General instructions     Drink enough fluid to keep your urine pale yellow.Do not touch or rub the infected area.Raise (elevate) the infected area above the level of your heart while you are sitting or lying down.Apply warm or cold compresses to the affected area as told by your health care provider.Keep all follow-up visits as told by your health care provider. This is important. These visits let your health care provider make sure a more serious infection is not developing.Contact a health care provider if:  You have a fever.Your symptoms do not begin to improve within 1–2 days of starting treatment.Your bone or joint underneath the infected area becomes painful after the skin has healed.Your infection returns in the same area or another area.You notice a swollen bump in the infected area.You develop new symptoms.You have a general ill feeling (malaise) with muscle aches and pains.Get help right away if:  Your symptoms get worse.You feel very sleepy.You develop vomiting or diarrhea that persists.You notice red streaks coming from the infected area.Your red area gets larger or turns dark in color.These symptoms may represent a serious problem that is an emergency. Do not wait to see if the symptoms will go away. Get medical help right away. Call your local emergency services (911 in the [place] Do not drive yourself to the hospital.   Summary  Cellulitis is a skin infection. This condition occurs most often in the arms and lower legs.Treatment for this condition may include medicines, such as antibiotic medicines or antihistamines.Take over-the-counter and prescription medicines only as told by your health care provider. If you were prescribed an antibiotic medicine, do not stop taking the antibiotic even if you start to feel better.Contact a health care provider if your symptoms do not begin to improve within 1–2 days of starting treatment or your symptoms get worse.Keep all follow-up visits as told by your health care provider. This is important. These visits let your health care provider make sure that a more serious infection is not developing.This information is not intended to replace advice given to you by your health care provider. Make sure you discuss any questions you have with your health care provider.    Document Released: 09/27/2006 Document Revised: 05/09/2019 Document Reviewed: 05/09/2019  [employer] Patient Education © 2020 [employer]

## 2020-08-30 NOTE — ED ADULT NURSE NOTE - NS PRO PASSIVE SMOKE EXP
For future refills, please contact your pharmacy. Thank you!  In the next few weeks you may receive a Press Ganey survey regarding your most recent clinic visit with us.  Please take a few moments out of your day to accurately evaluate your visit.  We strive to provide you with the best medical care.  Again, thank you for your time and we look forward to your next visit.         If we need to contact you regarding any test results, we will make 2 attempts to reach you at the number you have listed during your office visit today. If we are unable to reach you, a letter with your results and any further instructions will be mailed to you home.        
No

## 2020-08-30 NOTE — ED PROVIDER NOTE - SKIN, MLM
extensive cat scratches to ue/le BL- L hand site- mild swelling/redness to dorsum of hand w weeping form wound, ventral lac c/d/i - L calf area of redness/warmth at about the line of demarcation that was drawn

## 2020-08-30 NOTE — ED PROVIDER NOTE - CLINICAL SUMMARY MEDICAL DECISION MAKING FREE TEXT BOX
cat bite w 2 sites of cellulitis- give IV dose unasyn here-cont augmentin- FU w hand surgeon tomorrow

## 2020-08-30 NOTE — ED ADULT NURSE NOTE - BREATHING
spontaneous Nsaids Pregnancy And Lactation Text: These medications are considered safe up to 30 weeks gestation. It is excreted in breast milk.

## 2020-08-30 NOTE — ED PROVIDER NOTE - CARE PLAN
(4) rarely moist Principal Discharge DX:	Cat bite, initial encounter Principal Discharge DX:	Cellulitis, unspecified cellulitis site

## 2020-08-31 PROBLEM — E03.9 HYPOTHYROIDISM, UNSPECIFIED: Chronic | Status: ACTIVE | Noted: 2020-08-29

## 2020-09-01 ENCOUNTER — EMERGENCY (EMERGENCY)
Facility: HOSPITAL | Age: 40
LOS: 1 days | Discharge: HOME | End: 2020-09-01
Attending: EMERGENCY MEDICINE | Admitting: EMERGENCY MEDICINE
Payer: MEDICAID

## 2020-09-01 VITALS
WEIGHT: 138.01 LBS | DIASTOLIC BLOOD PRESSURE: 65 MMHG | HEART RATE: 59 BPM | OXYGEN SATURATION: 98 % | SYSTOLIC BLOOD PRESSURE: 98 MMHG | HEIGHT: 67 IN | RESPIRATION RATE: 18 BRPM | TEMPERATURE: 98 F

## 2020-09-01 DIAGNOSIS — S81.852D OPEN BITE, LEFT LOWER LEG, SUBSEQUENT ENCOUNTER: ICD-10-CM

## 2020-09-01 DIAGNOSIS — Z23 ENCOUNTER FOR IMMUNIZATION: ICD-10-CM
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
## 2020-09-01 PROCEDURE — 99283 EMERGENCY DEPT VISIT LOW MDM: CPT | Mod: 25

## 2020-09-01 PROCEDURE — 99283 EMERGENCY DEPT VISIT LOW MDM: CPT

## 2020-09-01 PROCEDURE — 90675 RABIES VACCINE IM: CPT

## 2020-09-01 PROCEDURE — 90471 IMMUNIZATION ADMIN: CPT

## 2020-09-01 RX ORDER — RABIES VACC, HUMAN DIPLOID/PF 2.5 UNIT
1 VIAL (EA) INTRAMUSCULAR ONCE
Refills: 0 | Status: COMPLETED | OUTPATIENT
Start: 2020-09-01 | End: 2020-09-01

## 2020-09-01 RX ORDER — HYDROXYZINE HCL 10 MG
2 TABLET ORAL
Qty: 20 | Refills: 0
Start: 2020-09-01

## 2020-09-01 RX ADMIN — Medication 1 MILLILITER(S): at 17:22

## 2020-09-01 NOTE — ED PROVIDER NOTE - ATTENDING CONTRIBUTION TO CARE
39 yo attacked by cat over the weekend, on Friday night, placed on augmentin, seen by plastics s/p I&D w multiple scratch wounds. here for second rabies shot. Well appearing, nad, nc/at, lung cta, heart reg, abd soft, nt, ext no gross deformity, no gross neuro deficits. no new complaints, f/c, worsening of redness, warmth. 39 yo attacked by cat over the weekend, on Friday night, placed on Augmentin, seen by plastics s/p I&D w multiple scratch wounds. here for second rabies shot. Well appearing, nad, nc/at, lung cta, heart reg, abd soft, nt, ext no gross deformity, no gross neuro deficits. no new complaints, f/c, worsening of redness, warmth.

## 2020-09-01 NOTE — ED PROVIDER NOTE - PATIENT PORTAL LINK FT
You can access the FollowMyHealth Patient Portal offered by [facility] by registering at the following website: [email or web address]. By joining [employer] [facility] [employer]’s FollowMyHealth portal, you will also be able to view your health information using other applications (apps) compatible with our system.

## 2020-09-01 NOTE — ED ADULT NURSE NOTE - OBJECTIVE STATEMENT
41 y/o female received into the ED, axox4, stating that she is here for a follow up rabies vaccination.  Pt. received first vaccination 3 days in the ED. Denies any other medical complaints at this time.

## 2020-09-01 NOTE — ED ADULT NURSE NOTE - NSIMPLEMENTINTERV_GEN_ALL_ED
Implemented All Universal Safety Interventions:  [place] to call system. Call bell, personal items and telephone within reach. Instruct patient to call for assistance. Room bathroom lighting operational. Non-slip footwear when patient is off stretcher. Physically safe environment: no spills, clutter or unnecessary equipment. Stretcher in lowest position, wheels locked, appropriate side rails in place.

## 2020-09-01 NOTE — ED PROVIDER NOTE - OBJECTIVE STATEMENT
39 y/o F p/w 2nd rabies vaccination. Has cat bites and scratch wounds. Initially occurred on th 29th in the ED. Pt in Augmentin. Still reports redness to L calf. Wounds improving.

## 2020-09-01 NOTE — ED PROVIDER NOTE - NSFOLLOWUPINSTRUCTIONS_ED_ALL_ED_FT
Please return to ED for rabies vaccine on 9/5 and 9/12    Rabies Vaccine (Injection) (Injectable) - Med Essential Fact Sheet     Rx static image Rabies Vaccine (Imovax Rabies, RabAvert) - (By injection)   Why this medicine is used:  Prevents infection caused by rabies virus.    Contact a nurse or doctor right away if you have:  Muscle pain, stiffness, or weakness  Numbness, tingling, or burning pain in your hands, arms, legs, or feet       Common side effects:  Dizziness, headache, fever  Nausea, stomach pain, tiredness

## 2020-09-01 NOTE — ED PROVIDER NOTE - SKIN, MLM
Skin normal color for race, warm, dry and intact. Multiple scratch bites to body. Mild erythema to posterior L calf. No fluctuance no streaking. Puncture wound to the proximal L 2nd finger. Mild swelling. Decrease ROM to the digit. Sensation intact distally. No pain with passive extension.

## 2020-09-01 NOTE — ED PROVIDER NOTE - CLINICAL SUMMARY MEDICAL DECISION MAKING FREE TEXT BOX
39 y/o F p/w 2nd rabies vaccine after bites and scratches from cat. Vaccine given, wounds well healing. Will f/u with hand surgery.
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
## 2020-09-02 DIAGNOSIS — S60.512A ABRASION OF LEFT HAND, INITIAL ENCOUNTER: ICD-10-CM
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
## 2020-09-02 DIAGNOSIS — S80.812A ABRASION, LEFT LOWER LEG, INITIAL ENCOUNTER: ICD-10-CM
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
## 2020-09-02 DIAGNOSIS — S61.251A OPEN BITE OF LEFT INDEX FINGER WITHOUT DAMAGE TO NAIL, INITIAL ENCOUNTER: ICD-10-CM

## 2020-09-02 DIAGNOSIS — S80.811A ABRASION, RIGHT LOWER LEG, INITIAL ENCOUNTER: ICD-10-CM
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
## 2020-09-03 LAB
-  AMOXICILLIN/CLAVULANIC ACID: SIGNIFICANT CHANGE UP
-  AMPICILLIN: SIGNIFICANT CHANGE UP
-  CEFTRIAXONE: SIGNIFICANT CHANGE UP
-  ERYTHROMYCIN: SIGNIFICANT CHANGE UP
-  LEVOFLOXACIN: SIGNIFICANT CHANGE UP
-  PENICILLIN: SIGNIFICANT CHANGE UP
-  TETRACYCLINE: SIGNIFICANT CHANGE UP
-  TRIMETHOPRIM/SULFAMETHOXAZOLE: SIGNIFICANT CHANGE UP
CULTURE RESULTS: SIGNIFICANT CHANGE UP
METHOD TYPE: SIGNIFICANT CHANGE UP
ORGANISM # SPEC MICROSCOPIC CNT: SIGNIFICANT CHANGE UP
ORGANISM # SPEC MICROSCOPIC CNT: SIGNIFICANT CHANGE UP
SPECIMEN SOURCE: SIGNIFICANT CHANGE UP

## 2020-09-03 NOTE — ADDENDUM
[FreeTextEntry1] : 9/3/2020 AL\par Labs reviewed and discussed with pt via phone. TSH low 0.32, free T4 0.7, Total T3 82. TSI and TG neg.\par Will decrease armour from 67.5mg to 60mg daily. Repeat TFTs in 6-8 weeks. \par Of note, PTHrp resulted, but I didn't order. F/U with [facility]. Low possibly due to Vitamin D level. Will check with next labs. Will mail lab req.

## 2020-09-03 NOTE — ASSESSMENT
[FreeTextEntry1] : Hypothyroidism. Difficult to interpret most recent labs. TSH low normal with low thyroxine. Need to repeat labs after colonoscopy. Will continue same treatment until then. We discussed switching to T4 + T3 therapy with Tirosint and Cytomel (both gluten free per the ; would consider starting levothyroxine 100 mcg and liothyronine 10 mcg) in the future. Surveillance thyroid US ordered. She will email me any labs and reports that she has. \par \par \par  \par

## 2020-09-04 ENCOUNTER — EMERGENCY (EMERGENCY)
Facility: HOSPITAL | Age: 40
LOS: 1 days | Discharge: HOME | End: 2020-09-04
Attending: EMERGENCY MEDICINE | Admitting: EMERGENCY MEDICINE
Payer: MEDICAID

## 2020-09-04 VITALS
SYSTOLIC BLOOD PRESSURE: 113 MMHG | HEART RATE: 83 BPM | HEIGHT: 67 IN | DIASTOLIC BLOOD PRESSURE: 73 MMHG | TEMPERATURE: 98 F | RESPIRATION RATE: 18 BRPM | OXYGEN SATURATION: 98 %

## 2020-09-04 LAB
ALBUMIN SERPL ELPH-MCNC: 4.5 G/DL — SIGNIFICANT CHANGE UP (ref 3.3–5)
ALP SERPL-CCNC: 59 U/L — SIGNIFICANT CHANGE UP (ref 40–120)
ALT FLD-CCNC: 8 U/L — LOW (ref 10–45)
ANION GAP SERPL CALC-SCNC: 10 MMOL/L — SIGNIFICANT CHANGE UP (ref 5–17)
AST SERPL-CCNC: 17 U/L — SIGNIFICANT CHANGE UP (ref 10–40)
BASOPHILS # BLD AUTO: 0.03 K/UL — SIGNIFICANT CHANGE UP (ref 0–0.2)
BASOPHILS NFR BLD AUTO: 0.4 % — SIGNIFICANT CHANGE UP (ref 0–2)
BILIRUB SERPL-MCNC: 0.2 MG/DL — SIGNIFICANT CHANGE UP (ref 0.2–1.2)
BUN SERPL-MCNC: 10 MG/DL — SIGNIFICANT CHANGE UP (ref 7–23)
CALCIUM SERPL-MCNC: 9.3 MG/DL — SIGNIFICANT CHANGE UP (ref 8.4–10.5)
CHLORIDE SERPL-SCNC: 100 MMOL/L — SIGNIFICANT CHANGE UP (ref 96–108)
CO2 SERPL-SCNC: 28 MMOL/L — SIGNIFICANT CHANGE UP (ref 22–31)
CREAT SERPL-MCNC: 0.59 MG/DL — SIGNIFICANT CHANGE UP (ref 0.5–1.3)
EOSINOPHIL # BLD AUTO: 0.08 K/UL — SIGNIFICANT CHANGE UP (ref 0–0.5)
EOSINOPHIL NFR BLD AUTO: 1.2 % — SIGNIFICANT CHANGE UP (ref 0–6)
GLUCOSE SERPL-MCNC: 96 MG/DL — SIGNIFICANT CHANGE UP (ref 70–99)
HCT VFR BLD CALC: 39.5 % — SIGNIFICANT CHANGE UP (ref 34.5–45)
HGB BLD-MCNC: 12.7 G/DL — SIGNIFICANT CHANGE UP (ref 11.5–15.5)
IMM GRANULOCYTES NFR BLD AUTO: 0.4 % — SIGNIFICANT CHANGE UP (ref 0–1.5)
LYMPHOCYTES # BLD AUTO: 2.26 K/UL — SIGNIFICANT CHANGE UP (ref 1–3.3)
LYMPHOCYTES # BLD AUTO: 33.6 % — SIGNIFICANT CHANGE UP (ref 13–44)
MCHC RBC-ENTMCNC: 30.8 PG — SIGNIFICANT CHANGE UP (ref 27–34)
MCHC RBC-ENTMCNC: 32.2 GM/DL — SIGNIFICANT CHANGE UP (ref 32–36)
MCV RBC AUTO: 95.9 FL — SIGNIFICANT CHANGE UP (ref 80–100)
MONOCYTES # BLD AUTO: 0.49 K/UL — SIGNIFICANT CHANGE UP (ref 0–0.9)
MONOCYTES NFR BLD AUTO: 7.3 % — SIGNIFICANT CHANGE UP (ref 2–14)
NEUTROPHILS # BLD AUTO: 3.83 K/UL — SIGNIFICANT CHANGE UP (ref 1.8–7.4)
NEUTROPHILS NFR BLD AUTO: 57.1 % — SIGNIFICANT CHANGE UP (ref 43–77)
NRBC # BLD: 0 /100 WBCS — SIGNIFICANT CHANGE UP (ref 0–0)
PLATELET # BLD AUTO: 254 K/UL — SIGNIFICANT CHANGE UP (ref 150–400)
POTASSIUM SERPL-MCNC: 3.8 MMOL/L — SIGNIFICANT CHANGE UP (ref 3.5–5.3)
POTASSIUM SERPL-SCNC: 3.8 MMOL/L — SIGNIFICANT CHANGE UP (ref 3.5–5.3)
PROT SERPL-MCNC: 8 G/DL — SIGNIFICANT CHANGE UP (ref 6–8.3)
RBC # BLD: 4.12 M/UL — SIGNIFICANT CHANGE UP (ref 3.8–5.2)
RBC # FLD: 11.5 % — SIGNIFICANT CHANGE UP (ref 10.3–14.5)
SODIUM SERPL-SCNC: 138 MMOL/L — SIGNIFICANT CHANGE UP (ref 135–145)
WBC # BLD: 6.72 K/UL — SIGNIFICANT CHANGE UP (ref 3.8–10.5)
WBC # FLD AUTO: 6.72 K/UL — SIGNIFICANT CHANGE UP (ref 3.8–10.5)

## 2020-09-04 PROCEDURE — 36415 COLL VENOUS BLD VENIPUNCTURE: CPT

## 2020-09-04 PROCEDURE — 73701 CT LOWER EXTREMITY W/DYE: CPT | Mod: 26,LT

## 2020-09-04 PROCEDURE — 80053 COMPREHEN METABOLIC PANEL: CPT

## 2020-09-04 PROCEDURE — 96360 HYDRATION IV INFUSION INIT: CPT | Mod: XU

## 2020-09-04 PROCEDURE — 99284 EMERGENCY DEPT VISIT MOD MDM: CPT

## 2020-09-04 PROCEDURE — 73701 CT LOWER EXTREMITY W/DYE: CPT

## 2020-09-04 PROCEDURE — 99284 EMERGENCY DEPT VISIT MOD MDM: CPT | Mod: 25

## 2020-09-04 PROCEDURE — 85025 COMPLETE CBC W/AUTO DIFF WBC: CPT

## 2020-09-04 RX ORDER — SODIUM CHLORIDE 9 MG/ML
1000 INJECTION INTRAMUSCULAR; INTRAVENOUS; SUBCUTANEOUS ONCE
Refills: 0 | Status: COMPLETED | OUTPATIENT
Start: 2020-09-04 | End: 2020-09-04

## 2020-09-04 RX ADMIN — Medication 1 MILLIGRAM(S): at 18:50

## 2020-09-04 RX ADMIN — SODIUM CHLORIDE 1000 MILLILITER(S): 9 INJECTION INTRAMUSCULAR; INTRAVENOUS; SUBCUTANEOUS at 19:05

## 2020-09-04 RX ADMIN — SODIUM CHLORIDE 1000 MILLILITER(S): 9 INJECTION INTRAMUSCULAR; INTRAVENOUS; SUBCUTANEOUS at 18:29

## 2020-09-04 NOTE — ED PROVIDER NOTE - CLINICAL SUMMARY MEDICAL DECISION MAKING FREE TEXT BOX
39 y/o f hx hypothyroidism presents s/p attacked by cat 6 days ago for evaluation of left calf swelling, redness; area has overall improved since starting abx, still has mild erythema and swelling to area, sent by plastic surgeon for CT with IV contrast to eval for abscess.  VSS in ED, ext NVI, labs wnl, CT lower ext done, will f/u result.

## 2020-09-04 NOTE — ED PROVIDER NOTE - ATTENDING CONTRIBUTION TO CARE
induration /swelling of calf post cat bite. improving but not resolved with augmentin. sent by plastics for ct to r/o abscess.  ct done and no abscess seen.  to continue abx/ outpatient f/u.

## 2020-09-04 NOTE — ED PROVIDER NOTE - PATIENT PORTAL LINK FT
You can access the FollowMyHealth Patient Portal offered by [facility] by registering at the following website: [email or web address]. By joining [facility]’s FollowMyHealth portal, you will also be able to view your health information using other applications (apps) compatible with our system.

## 2020-09-04 NOTE — ED ADULT TRIAGE NOTE - CHIEF COMPLAINT QUOTE
41 y/o female returns to ED for evaluation of cat bite.
Hpi Title: Evaluation of Skin Lesions
How Severe Are Your Spot(S)?: mild
Have Your Spot(S) Been Treated In The Past?: has not been treated

## 2020-09-04 NOTE — ED ADULT NURSE NOTE - OBJECTIVE STATEMENT
40 y.o F a&ox4 walked in from triage c.o cat bite. pt reports having a cat bite on 29th, presents today due to worsening LLL pain and swelling. denies fevers, chills, n/v/d, hematuria. up to date with rabies vaccines.

## 2020-09-04 NOTE — ED PROVIDER NOTE - SKIN, MLM
multiple cat scratch/bite wounds to b/l legs, arms, right upper back, right upper arm with no surrounding erythema, no swelling

## 2020-09-04 NOTE — ED ADULT NURSE NOTE - CHPI ED NUR SYMPTOMS NEG
no nausea/no dizziness/no decreased eating/drinking/no chills/no fever/no pain/no tingling/no vomiting/no weakness

## 2020-09-04 NOTE — ED PROVIDER NOTE - MUSCULOSKELETAL, MLM
left calf +4 cm area of erythema with induration, no fluctuance, no pustule, no streaking, +tenderness to palpation

## 2020-09-04 NOTE — ED PROVIDER NOTE - NSFOLLOWUPINSTRUCTIONS_ED_ALL_ED_FT
Animal Bite    WHAT YOU NEED TO KNOW:    Animal bite injuries range from shallow cuts to deep, life-threatening wounds. An animal can cut or puncture the skin when it bites. Your skin may be torn from your body. Your skin may swell or bruise even if the bite does not break the skin. Animal bites occur more often on the hands, arms, legs, and face. Bites from dogs and cats are the most common injuries.    DISCHARGE INSTRUCTIONS:    Return to the emergency department if:     You have a fever.      Your wound is red, swollen, and draining pus.      You see red streaks on the skin around the wound.      You can no longer move the bitten area.      Your heartbeat and breathing are much faster than usual.      You feel dizzy and confused.    Contact your healthcare provider if:     Your pain does not get better, even after you take pain medicine.      You have nightmares or flashbacks about the animal bite.       You have questions or concerns about your condition or care.    Medicines: You may need any of the following:     Antibiotics prevent or treat a bacterial infection.      Prescription pain medicine may be given. Ask how to take this medicine safely.      A tetanus vaccine may be needed to prevent tetanus. Tetanus is a life-threatening bacterial infection that affects the nerves and muscles. The bacteria can be spread through animal bites.       A rabies vaccine may be needed to prevent rabies. Rabies is a life-threatening viral infection. The virus can be spread through animal bites.      Take your medicine as directed. Contact your healthcare provider if you think your medicine is not helping or if you have side effects. Tell him of her if you are allergic to any medicine. Keep a list of the medicines, vitamins, and herbs you take. Include the amounts, and when and why you take them. Bring the list or the pill bottles to follow-up visits. Carry your medicine list with you in case of an emergency.    Follow up with your healthcare provider in 1 to 2 days: You may need to return to have your stitches removed. Write down your questions so you remember to ask them during your visits.    Self-care:     Apply antibiotic ointment as directed. This helps prevent infection in minor skin wounds. It is available without a doctor's order.      Keep the wound clean and covered. Wash the wound every day with soap and water or germ-killing cleanser. Ask your healthcare provider about the kinds of bandages to use.       Apply ice on your wound. Ice helps decrease swelling and pain. Ice may also help prevent tissue damage. Use an ice pack, or put crushed ice in a plastic bag. Cover it with a towel and place it on your wound for 15 to 20 minutes every hour or as directed.      Elevate the wound area. Raise your wound above the level of your heart as often as you can. This will help decrease swelling and pain. Prop your wound on pillows or blankets to keep it elevated comfortably.     Prevent another animal bite:     Learn to recognize the signs of a scared or angry pet. Avoid quick, sudden movements.      Do not step between animals that are fighting.      Do not leave a pet alone with a young child.      Do not disturb an animal while it eats, sleeps, or cares for its young.      Do not approach an animal you do not know, especially one that is tied up or caged.      Stay away from animals that seem sick or act strangely.      Do not feed or capture wild animals.

## 2020-09-04 NOTE — ED PROVIDER NOTE - OBJECTIVE STATEMENT
39 y/o f hx hypothyroidism presents sent by plastic surgeon Dr. Emiliano Mcfarlane for evaluation of swelling to left calf, concerned for possible abscess.  Pt was attacked by a cat 6 days ago, has been taking augmentin, has had redness and swelling to left calf since shortly after a bite wound to the area.  Pt stating the pain to the area has decreased, redness also has decreased compared with lines demarcating redness the day after the attack.  Pt saw Dr. Mcfarlane this afternoon who thought it looked concerning for abscess.  Denies fever, chills, numbness/tingling to ext, all other ROS negative.

## 2020-09-05 ENCOUNTER — EMERGENCY (EMERGENCY)
Facility: HOSPITAL | Age: 40
LOS: 1 days | Discharge: HOME | End: 2020-09-05
Attending: EMERGENCY MEDICINE | Admitting: EMERGENCY MEDICINE
Payer: MEDICAID

## 2020-09-05 VITALS
WEIGHT: 139.99 LBS | HEART RATE: 67 BPM | TEMPERATURE: 98 F | HEIGHT: 67 IN | DIASTOLIC BLOOD PRESSURE: 65 MMHG | SYSTOLIC BLOOD PRESSURE: 113 MMHG | RESPIRATION RATE: 18 BRPM | OXYGEN SATURATION: 98 %

## 2020-09-05 DIAGNOSIS — Z91.018 ALLERGY TO OTHER FOODS: ICD-10-CM

## 2020-09-05 DIAGNOSIS — L53.9 ERYTHEMATOUS CONDITION, UNSPECIFIED: ICD-10-CM
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
## 2020-09-05 DIAGNOSIS — Z79.2 LONG TERM (CURRENT) USE OF ANTIBIOTICS: ICD-10-CM

## 2020-09-05 DIAGNOSIS — S81.832D PUNCTURE WOUND WITHOUT FOREIGN BODY, LEFT LOWER LEG, SUBSEQUENT ENCOUNTER: ICD-10-CM

## 2020-09-05 DIAGNOSIS — Z79.891 LONG TERM (CURRENT) USE OF OPIATE ANALGESIC: ICD-10-CM

## 2020-09-05 DIAGNOSIS — Z79.899 OTHER LONG TERM (CURRENT) DRUG THERAPY: ICD-10-CM

## 2020-09-05 DIAGNOSIS — Z20.3 CONTACT WITH AND (SUSPECTED) EXPOSURE TO RABIES: ICD-10-CM

## 2020-09-05 PROCEDURE — 99283 EMERGENCY DEPT VISIT LOW MDM: CPT | Mod: 25

## 2020-09-05 PROCEDURE — 90675 RABIES VACCINE IM: CPT

## 2020-09-05 PROCEDURE — 99283 EMERGENCY DEPT VISIT LOW MDM: CPT

## 2020-09-05 PROCEDURE — 90471 IMMUNIZATION ADMIN: CPT

## 2020-09-05 RX ORDER — RABIES VACC, HUMAN DIPLOID/PF 2.5 UNIT
1 VIAL (EA) INTRAMUSCULAR ONCE
Refills: 0 | Status: COMPLETED | OUTPATIENT
Start: 2020-09-05 | End: 2020-09-05

## 2020-09-05 RX ADMIN — Medication 1 MILLILITER(S): at 14:56

## 2020-09-05 NOTE — ED ADULT NURSE NOTE - NSIMPLEMENTINTERV_GEN_ALL_ED
Implemented All Universal Safety Interventions:  [place] to call system. Call bell, personal items and telephone within reach. Instruct patient to call for assistance. Room bathroom lighting operational. Non-slip footwear when patient is off stretcher. Physically safe environment: no spills, clutter or unnecessary equipment. Stretcher in lowest position, wheels locked, appropriate side rails in place.

## 2020-09-05 NOTE — ED PROVIDER NOTE - ATTENDING CONTRIBUTION TO CARE
40F p/w need for rabies vaccine # 3, seen here recently for cellulitis L calf w/ abrasion, +taking abx, +soreness L calf. No f/c or discharge. Per PA exam no e/o nec fasc, wound is improving. Concern cellulitis, cat bite, received rabies Vax#3. PA discussed case w/ me in real time. Pt elected for discharge prior to my availability for face-face discussion.

## 2020-09-05 NOTE — ED PROVIDER NOTE - OBJECTIVE STATEMENT
The pt is a 41 y/o F, who returns to ED for rabies vaccine # 3 - was seen in ED yest and had a ct of L calf - cellulitis no abscess, pt w/multiple abrasions and puncture wounds, has been taking abx, having soreness to L calf but admits to decreased redness to site. Denies fevers, chills, pus, decreased rom

## 2020-09-05 NOTE — ED PROVIDER NOTE - NSFOLLOWUPINSTRUCTIONS_ED_ALL_ED_FT
CONTINUE TAKING YOUR ANTIBIOTICS  RETURN ON DAY 14 FOR YOUR FINAL RABIES VACCINE  RETURN TO ED IMMEDIATELY FOR FEVERS, PUS, WORSENING SYMPTOMS  Cellulitis, Adult  Cellulitis is a skin infection. The infected area is often warm, red, swollen, and sore. It occurs most often in the arms and lower legs. It is very important to get treated for this condition.  What are the causes?  This condition is caused by bacteria. The bacteria enter through a break in the skin, such as a cut, burn, insect bite, open sore, or crack.  What increases the risk?  This condition is more likely to occur in people who:  Have a weak body defense system (immune system). Have open cuts, burns, bites, or scrapes on the skin.Are older than 60 years of age.Have a blood sugar problem (diabetes). Have a long-lasting (chronic) liver disease (cirrhosis) or kidney disease.Are very overweight (obese).Have a skin problem, such as:  Itchy rash (eczema).Slow movement of blood in the veins (venous stasis).Fluid buildup below the skin (edema).Have been treated with high-energy rays (radiation).Use IV drugs. What are the signs or symptoms?  Symptoms of this condition include:  Skin that is:  Red.Streaking.Spotting.Swollen.Sore or painful when you touch it.Warm.A fever.Chills. Blisters.How is this diagnosed?  This condition is diagnosed based on:  Medical history.Physical exam.Blood tests.Imaging tests.How is this treated?  Treatment for this condition may include:  Medicines to treat infections or allergies.Home care, such as:  Rest.Placing cold or warm cloths (compresses) on the skin.Hospital care, if the condition is very bad.Follow these instructions at home:  Medicines   Take over-the-counter and prescription medicines only as told by your doctor.If you were prescribed an antibiotic medicine, take it as told by your doctor. Do not stop taking it even if you start to feel better.General instructions   Drink enough fluid to keep your pee (urine) pale yellow.Do not touch or rub the infected area.Raise (elevate) the infected area above the level of your heart while you are sitting or lying down.Place cold or warm cloths on the area as told by your doctor.Keep all follow-up visits as told by your doctor. This is important.Contact a doctor if:  You have a fever.You do not start to get better after 1–2 days of treatment.Your bone or joint under the infected area starts to hurt after the skin has healed.Your infection comes back. This can happen in the same area or another area.You have a swollen bump in the area.You have new symptoms.You feel ill and have muscle aches and pains.Get help right away if:  Your symptoms get worse.You feel very sleepy.You throw up (vomit) or have watery poop (diarrhea) for a long time.You see red streaks coming from the area.Your red area gets larger.Your red area turns dark in color.These symptoms may represent a serious problem that is an emergency. Do not wait to see if the symptoms will go away. Get medical help right away. Call your local emergency services (911 in the [place] Do not drive yourself to the hospital.   Summary  Cellulitis is a skin infection. The area is often warm, red, swollen, and sore.This condition is treated with medicines, rest, and cold and warm cloths.Take all medicines only as told by your doctor.Tell your doctor if symptoms do not start to get better after 1–2 days of treatment.

## 2020-09-05 NOTE — ED PROVIDER NOTE - SKIN, MLM
multiple abrasions and puncture wounds to UE/LE, the L posterior calf wound is marked - sig decrease in erythema as compared to marked area, + localized induration, no pus, no bleeding, no ascending lymphangitis

## 2020-09-05 NOTE — ED ADULT NURSE NOTE - OBJECTIVE STATEMENT
Pt has several cat bites and scratches on her body that she has been seen for. Pt concerned about the bite on the back of her left leg. Pt still has swelling around 2 of the four puncture wounds. However, the there is marked improvement from pt original cellulitis. (Marker is still visible) and are of redness as decreased greatly. Pt other wounds are healing well. No signs of infection.

## 2020-09-05 NOTE — ED PROVIDER NOTE - CLINICAL SUMMARY MEDICAL DECISION MAKING FREE TEXT BOX
pt returns for rabies vaccine #3, on abx - was bitten and scratched by a cat, seen last night in ED and had labs/ct of l calf and advised to con't po abx as rx'd, today wanting wound check as well - decreased erythema based on marked area, rabies vaccine given, advised to con't po abx and wound care, rabies vaccine #4 on day 14, to f/u w/her pmd or her plastic surg, strict return precautions given; upon dc pt also requesting rx fo antianxiety meds - explained that can not be done from ed and she must f/u w/pmd for that - agreeable w/plan

## 2020-09-05 NOTE — ED PROVIDER NOTE - CARE PLAN
Principal Discharge DX:	Rabies, need for prophylactic vaccination against  Secondary Diagnosis:	Cat bite

## 2020-09-05 NOTE — ED PROVIDER NOTE - PATIENT PORTAL LINK FT
You can access the FollowMyHealth Patient Portal offered by [facility] by registering at the following website: [email or web address]. By joining [facility]’s FollowMyHealth portal, you will also be able to view your health information using other applications (apps) compatible with our system.
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
## 2020-09-08 DIAGNOSIS — S81.852D OPEN BITE, LEFT LOWER LEG, SUBSEQUENT ENCOUNTER: ICD-10-CM

## 2020-09-08 DIAGNOSIS — M79.662 PAIN IN LEFT LOWER LEG: ICD-10-CM
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
## 2020-09-12 ENCOUNTER — EMERGENCY (EMERGENCY)
Facility: HOSPITAL | Age: 40
LOS: 1 days | Discharge: HOME | End: 2020-09-12
Attending: EMERGENCY MEDICINE | Admitting: EMERGENCY MEDICINE
Payer: MEDICAID

## 2020-09-12 VITALS
OXYGEN SATURATION: 100 % | HEIGHT: 67 IN | RESPIRATION RATE: 18 BRPM | HEART RATE: 65 BPM | SYSTOLIC BLOOD PRESSURE: 104 MMHG | WEIGHT: 138.01 LBS | DIASTOLIC BLOOD PRESSURE: 71 MMHG | TEMPERATURE: 98 F

## 2020-09-12 DIAGNOSIS — M79.662 PAIN IN LEFT LOWER LEG: ICD-10-CM

## 2020-09-12 DIAGNOSIS — Z91.018 ALLERGY TO OTHER FOODS: ICD-10-CM

## 2020-09-12 DIAGNOSIS — Z48.01 ENCOUNTER FOR CHANGE OR REMOVAL OF SURGICAL WOUND DRESSING: ICD-10-CM

## 2020-09-12 DIAGNOSIS — Z91.011 ALLERGY TO MILK PRODUCTS: ICD-10-CM

## 2020-09-12 PROCEDURE — 99284 EMERGENCY DEPT VISIT MOD MDM: CPT

## 2020-09-12 PROCEDURE — 96372 THER/PROPH/DIAG INJ SC/IM: CPT

## 2020-09-12 PROCEDURE — 90675 RABIES VACCINE IM: CPT

## 2020-09-12 PROCEDURE — 99283 EMERGENCY DEPT VISIT LOW MDM: CPT | Mod: 25

## 2020-09-12 RX ORDER — RABIES VACC, HUMAN DIPLOID/PF 2.5 UNIT
1 VIAL (EA) INTRAMUSCULAR ONCE
Refills: 0 | Status: COMPLETED | OUTPATIENT
Start: 2020-09-12 | End: 2020-09-12

## 2020-09-12 RX ADMIN — Medication 1 MILLILITER(S): at 15:34

## 2020-09-12 NOTE — ED ADULT NURSE NOTE - NSFALLRSKHARMRISK_ED_ALL_ED
"Pt has been active in the milieu and social with peers. Observed playing the keyboard with peers. Actively participated in music therapy group. Affect full range. Elated on approach. Observed to be making delusional sounding statements. Described to peer that you must be careful \"not to fall off the end of the earth\" when exploring in groups. He denies SI/SIB and HI. No agitation or behavioral issues observed. Compliant with HS Zyprexa, and is denying any side effects. Will continue to monitor.  " no

## 2020-09-12 NOTE — ED PROVIDER NOTE - OBJECTIVE STATEMENT
39 y/o F with no PMHx presents to the ED s/p recent cat scratch/bite on Aug 28th, seen by plastics on Aug 29th and on Sep 8th, here for evaluation and dressing change for drained abscess on posterior L calf. Patient instructed to change packing 2x/day but has been unable to do herself due to pain, last packing done by PCP yesterday. Notes pain has worsened yesterday. Also here for rabies vaccine. Not currently on antibiotics. Has been trying to be set up with wound care but PCP unable to get her any help.

## 2020-09-12 NOTE — ED ADULT NURSE NOTE - OBJECTIVE STATEMENT
Pt came to ED for wound check and rabies shot #4. Pt has wound on LLE posterior that has been packed by PMD.  No redness, inflammation, discharge seen at this time. Pt denies fevers, chills, D/N/V, chest pain, SOB. Pt is alert and oriented x3.

## 2020-09-12 NOTE — ED ADULT TRIAGE NOTE - CHIEF COMPLAINT QUOTE
Patient came for wound check on her left leg surgical site and for rabies shot # 4 .  Denies any fever nor chills .

## 2020-09-12 NOTE — ED PROVIDER NOTE - NSFOLLOWUPINSTRUCTIONS_ED_ALL_ED_FT
please change gauze two times a day      when cleaning wound, use antibacterial soap    wet to dry dressing using sterile cause and sterile saline    keep wound clean and dry      please monitor for fevers or chills/worsening of symptoms        ACUTE WOUNDS - AfterCare(R) Instructions(ER/ED)           Acute Wounds    WHAT YOU NEED TO KNOW:    An acute wound is an injury that causes a break in the skin. As your wound begins to heal, it is normal to have some swelling, pain, and redness. Your body's immune system is working to keep your wound from getting infected. Your wound may develop a scab. The scab protects your wound as it heals.     DISCHARGE INSTRUCTIONS:    Call 911 for the following:   •You suddenly have trouble breathing or have chest pain.          Return to the emergency department if:   •Blood soaks through your bandage.      •You have pus or a foul odor coming from the wound.      •Your stitches come apart or your wound reopens.      Contact your healthcare provider if:   •You continue to have pain even after you have taken pain medicine.      •You have muscle, joint, or body aches, sweating, or a fever.      •You have increased swelling, redness, or bleeding in your wound.      •Your skin is itchy, swollen, or you have a rash.      •You have questions or concerns about your condition or care.      Medicines: You may need any of the following:   •Antibiotics may be given to prevent or treat an infection.       •Prescription pain medicine may be given. Ask your how to take this medicine safely.      •NSAIDs, such as ibuprofen, help decrease swelling, pain, and fever. This medicine is available with or without a doctor's order. NSAIDs can cause stomach bleeding or kidney problems in certain people. If you take blood thinner medicine, always ask if NSAIDs are safe for you. Always read the medicine label and follow directions. Do not give these medicines to children under 6 months of age without direction from your child's healthcare provider.      •Take your medicine as directed. Contact your healthcare provider if you think your medicine is not helping or if you have side effects. Tell him or her if you are allergic to any medicine. Keep a list of the medicines, vitamins, and herbs you take. Include the amounts, and when and why you take them. Bring the list or the pill bottles to follow-up visits. Carry your medicine list with you in case of an emergency.      Care for your wound as directed: Acute wounds can be in different locations and caused by different injuries. Follow your healthcare provider's instructions on caring for your type of wound. The following care items are for most wounds:  •Keep your wound covered with a clean and dry bandage. Change your bandage if it becomes wet or dirty. This will decrease the risk for infection in your wound. Follow your healthcare provider's instructions for changing your dressing.       •Do not soak in a tub or swim until your healthcare provider says it is okay. Your wound may open if you get it too wet. Dirt from the water can also get into your wound and cause an infection.      •Keep pets away from your wound. Pets carry germs that can cause a wound infection.       •Do not pick or scratch scabs. Let scabs fall off on their own. You may damage new skin that is forming under the scab. You may have a worse scar after the damage.      •Eat healthy foods and drink liquids as directed. Healthy foods give your body the nutrients it needs to heal your wound. Liquids prevent dehydration that can decrease the blood supply to your wound. Healthy foods include fruits, vegetables, grains (breads and cereals), dairy, and protein foods. Protein foods include meat, fish, nuts, and soy products. Protein, calories, vitamin C, and zinc help wounds heal. Ask your healthcare provider for more information about the foods you should eat to improve healing.       Follow up with your healthcare provider as directed: Write down your questions so you remember to ask them during your visits.        © Copyright [employer] 2020           back to top                          © Copyright [employer] 2020

## 2020-09-12 NOTE — ED PROVIDER NOTE - CLINICAL SUMMARY MEDICAL DECISION MAKING FREE TEXT BOX
39 y/o F, recent hx of cat scratch/bite, presents to the ED for further evaluation of L calf abscess which had I&D this past week by Dr. Mcfarlane, presenting for dressing change and rabies vaccination. On exam, patient appears well, nontoxic, L posterior calf dry, dressing placed. With hardness and induration superior to incision site, no noted cellulitis with ttp. Patient states concerned given the discomfort she is having, unable to set up with wound care, plan for vaccination administration and for dressing change. 41 y/o F, recent hx of cat scratch/bite, presents to the ED for further evaluation of L calf abscess which had I&D this past week by Dr. Mcfarlane, presenting for dressing change and rabies vaccination. vaccine is updated. On exam, patient appears well, nontoxic, L posterior calf dry, dressing placed. With hardness and induration superior to incision site, no noted cellulitis with ttp. Patient states concerned given the discomfort she is having, unable to set up with wound care, plan for vaccination administration and for dressing change. dressing changed by self. wound care set up by case management. pt instructed to follow up with Dr. Mcfarlane at appointment Tuesday. ED evaluation and management discussed with the patient and family (if available) in detail.  Close PMD follow up encouraged.  Strict ED return instructions discussed in detail and patient given the opportunity to ask any questions about their discharge diagnosis and instructions. Patient verbalized understanding. Patient is agreeable to plan.

## 2020-09-12 NOTE — ED PROVIDER NOTE - PHYSICAL EXAMINATION
General survey: Patient is well developed and well nourished. Patient is lying in stretcher, not diaphoretic and does not appear in acute distress.    Skin: left posterior mid calf- 2cm incision. base clean dry, no discharge. surrounding proximal induration, no cellulitis. ttp.  Warm dry and intact. No note of any edema, pallor, jaundice, erythema, ecchymosis, or purpura    Psych: Mood and affect appropriate

## 2020-09-12 NOTE — ED PROVIDER NOTE - PATIENT PORTAL LINK FT
You can access the FollowMyHealth Patient Portal offered by [facility] by registering at the following website: [email or web address]. By joining [facility]’s FollowMyHealth portal, you will also be able to view your health information using other applications (apps) compatible with our system.

## 2020-09-12 NOTE — ED ADULT NURSE NOTE - CHPI ED NUR SYMPTOMS NEG
no bleeding at site/no redness/no drainage/no purulent drainage/no bleeding/no chills/no fever/no inflammation

## 2020-09-12 NOTE — ED ADULT NURSE REASSESSMENT NOTE - NS ED NURSE REASSESS COMMENT FT1
PT wound cleaned and packed. Pt currently refuses DC. Pt received resource from  for wound care. Pt states she is concerned about "conflicting information". Pt was educated by KYRIE Arriaga and MAHOGANY Matt in regards to wound care.  Soc Worker Marycarmen at bedside.  MAHOGANY Matt is aware.

## 2020-11-05 DIAGNOSIS — R59.1 GENERALIZED ENLARGED LYMPH NODES: ICD-10-CM

## 2020-11-05 DIAGNOSIS — B27.00 GAMMAHERPESVIRAL MONONUCLEOSIS W/OUT COMPLICATION: ICD-10-CM

## 2020-11-12 ENCOUNTER — APPOINTMENT (OUTPATIENT)
Dept: ENDOCRINOLOGY | Facility: CLINIC | Age: 40
End: 2020-11-12
Payer: MEDICAID

## 2020-11-12 PROCEDURE — 99442: CPT

## 2021-01-19 ENCOUNTER — APPOINTMENT (OUTPATIENT)
Dept: PHYSICAL MEDICINE AND REHAB | Facility: CLINIC | Age: 41
End: 2021-01-19
Payer: MEDICAID

## 2021-01-19 VITALS
BODY MASS INDEX: 23.9 KG/M2 | WEIGHT: 140 LBS | DIASTOLIC BLOOD PRESSURE: 68 MMHG | RESPIRATION RATE: 17 BRPM | HEIGHT: 64 IN | HEART RATE: 74 BPM | SYSTOLIC BLOOD PRESSURE: 109 MMHG | TEMPERATURE: 97.6 F

## 2021-01-19 DIAGNOSIS — L91.0 HYPERTROPHIC SCAR: ICD-10-CM

## 2021-01-19 DIAGNOSIS — L90.5 SCAR CONDITIONS AND FIBROSIS OF SKIN: ICD-10-CM
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
## 2021-01-19 DIAGNOSIS — B99.9 OTHER SPECIFIED SOFT TISSUE DISORDERS: ICD-10-CM

## 2021-01-19 DIAGNOSIS — S61.451S OPEN BITE OF RIGHT HAND, SEQUELA: ICD-10-CM
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
## 2021-01-19 DIAGNOSIS — M79.89 OTHER SPECIFIED SOFT TISSUE DISORDERS: ICD-10-CM

## 2021-01-19 DIAGNOSIS — M79.642 PAIN IN LEFT HAND: ICD-10-CM

## 2021-01-19 DIAGNOSIS — G89.29 PAIN IN LEFT HAND: ICD-10-CM

## 2021-01-19 DIAGNOSIS — S61.452S OPEN BITE OF LEFT HAND, SEQUELA: ICD-10-CM

## 2021-01-19 PROCEDURE — 99072 ADDL SUPL MATRL&STAF TM PHE: CPT

## 2021-01-19 PROCEDURE — 99204 OFFICE O/P NEW MOD 45 MIN: CPT

## 2021-01-19 NOTE — PHYSICAL EXAM
[FreeTextEntry1] : PHYSICAL EXAM : OBJECTIVE \par \par GENERAL : Awake ,alert and oriented to time place and person \par HEAD : normocephalic and atraumatic \par NECK : supple ,no tracheal deviation ,no thyroid enlargement noted with swallowing\par EYES : sclera and conjunctiva normal no redness,intact extraocular movements \par ENT  : ears and nose normal in appearance -hearing adequate \par PULMONARY: effort normal. No respiratory distress. breathing regular. No wheezes \par LYMPH : No swelling in limbs, capillary return within normal range \par CVS : warm extremities,no palpitations,not short of breath, no visible jugular venous distention\par PSYCH : mood and affect normal ,good eye contact ,normal attention \par ABDOMEN : no visible distension , \par NEUROLOGICAL:cranial nerves intact,muscle tone normal,gait and balance safe except where noted below \par SKIN : warm and dry No rash detected over specific body areas examined \par MUSCULOSKELETAL: normal muscle bulk, no focal bony tenderness /posture normal except where specified below\par tight web \par scar tender raised non infected \par NVI \par grasp ok \par PAD DAB mildly weakened \par No long tract signs found on clinical exam and no focal neurological deficits\par

## 2021-01-19 NOTE — ASSESSMENT
[FreeTextEntry1] : \par PLAN AND RECOMMENDATIONS :\par \par We discussed differential diagnosis and clinical impression\par she has had a lot of PT still tight may need scar revision ? \par \par \par Recommend\par .symptomatic care and support try scar pads cocoa butter \par  medications NA \par  imaging NA \par  referral to DR Edmund Lorenzo \par \par  hydrotherapy /heat / cold for pain\par  continue  self massage \par  relative rest and avoidance of painful activity where possible \par  increasing activity as discussed \par  return for follow up prn \par \par

## 2021-01-19 NOTE — REVIEW OF SYSTEMS
[Patient Intake Form Reviewed] : Patient intake form was reviewed [Joint Stiffness] : joint stiffness [Negative] : Heme/Lymph [Fever] : no fever [Chills] : no chills [FreeTextEntry9] : web space

## 2021-01-19 NOTE — CONSULT LETTER
[FreeTextEntry1] : Dear Dr. NAVARRO  \par \par I had the pleasure of evaluating your patient, MAGDALENO ELDER .\par \par Thank you very much for allowing me to participate in the care of this patient. If you have any questions, please do not hesitate to contact me. \par \par Sincerely, \par Carolina Mills MD \par \par ABPMR Board Certified in Physical Medicine and Rehabilitation\par Certified Fellow of AANEM (Neuromuscular and Electrodiagnostic Medicine)\par Subspecialty certified in Sports Medicine (ABPMR)\par \par  of Physical Medicine and Rehabilitation\par [facility] [employer] [facility]\par [facility]\par \par

## 2021-01-25 ENCOUNTER — TRANSCRIPTION ENCOUNTER (OUTPATIENT)
Age: 41
End: 2021-01-25

## 2021-01-25 ENCOUNTER — RX RENEWAL (OUTPATIENT)
Age: 41
End: 2021-01-25

## 2021-01-26 ENCOUNTER — TRANSCRIPTION ENCOUNTER (OUTPATIENT)
Age: 41
End: 2021-01-26

## 2021-01-27 ENCOUNTER — RX RENEWAL (OUTPATIENT)
Age: 41
End: 2021-01-27

## 2021-02-04 ENCOUNTER — TRANSCRIPTION ENCOUNTER (OUTPATIENT)
Age: 41
End: 2021-02-04

## 2021-03-04 ENCOUNTER — TRANSCRIPTION ENCOUNTER (OUTPATIENT)
Age: 41
End: 2021-03-04

## 2021-03-17 ENCOUNTER — TRANSCRIPTION ENCOUNTER (OUTPATIENT)
Age: 41
End: 2021-03-17

## 2021-04-12 ENCOUNTER — TRANSCRIPTION ENCOUNTER (OUTPATIENT)
Age: 41
End: 2021-04-12

## 2021-04-15 ENCOUNTER — TRANSCRIPTION ENCOUNTER (OUTPATIENT)
Age: 41
End: 2021-04-15

## 2021-04-16 ENCOUNTER — TRANSCRIPTION ENCOUNTER (OUTPATIENT)
Age: 41
End: 2021-04-16

## 2021-05-13 NOTE — ED ADULT NURSE NOTE - CHIEF COMPLAINT QUOTE
Progress Note      Patient Name: Abril Hinkle   Patient ID: [id number]   Sex: Female   YOB: 1968    Primary Care Provider: Tiarra JOHNSON    Visit Date: June 23, 2020    Provider: ALEX Cuadra   Location: [facility]   Location Address: [address] [place] [address]  [id number]   Location Phone: [phone]          Chief Complaint  · Follow up      History Of Present Illness  Abril Hinkle is a 51 year old /White female who presents for evaluation and treatment of:      2 week follow up on Hypertension and Anxiety. Patient was seen at the beginning of June for hypertension and anxiety, at that time it was sporadically where she was taking her medicines, I instructed her to take medication as prescribed 20 in the morning and 20 in the evening. States she did not take blood pressure pill in 4 days due to blood pressure being to low.     Having occasional head pressure but has improved. Increased fatigue, headaches improved still occasional ones. Was started on Maxalt and she has taken one and worked well for her Migraines. Using Tylenol with little improvement for smaller headaches.     We also started her on Lexapro and hydroxyzine for anxiety. Overall she feels much better with the medications.      blood pressure recordings-   121/72  107/76  135/84 this am, went down after shower  has not had bp pill today             Past Medical History  Disease Name Date Onset Notes   Acne --  --    Anxiety --  --    Arthritis --  --    Cold sore --  --    CTS (carpal tunnel syndrome) --  --    GERD --  --    Hernia --  --    Hypothyroidism 04/15/2019 --    Migraines --  --    Night sweats --  --    Ruptured Breast Implant --  --    Screening Mammogram 8/2019 --          Past Surgical History  Procedure Name Date Notes   Appendectomy --  --    Breast augmentation --  --    Colonoscopy 9/2019 --    EGD 2019 --    Tonsilectomy --  --          Medication List  Name Date Started  39 y/o female returns to ED for evaluation of cat bite. Instructions   gabapentin 300 mg oral capsule 2020 take 1 capsule by oral route every 12 hours as needed for 30 days   hydroxyzine HCl 10 mg oral tablet 2020 Take 1 tab Q6 Hours PRN for anxiety, ok to take 2 at night for sleep.   levothyroxine 75 mcg oral tablet 2020 take 1 tablet (75 mcg) by oral route once daily for 30 days   Lexapro 10 mg oral tablet 2020 take 1 tablet (10 mg) by oral route once daily for 30 days   lisinopril 20 mg oral tablet 2020 take 2 tablets by oral route daily for 30 days   omeprazole 40 mg oral capsule,delayed release(/EC) 2020 TAKE 1 CAPSULE BY MOUTH ONCE DAILY BEFORE A MEAL for 30 days   sumatriptan succinate 50 mg oral tablet 2020 take 1 tablet once with fluids as early as possible after the onset of a migraine attack may repeat after 2 hours if headache returns,   temazepam 15 mg oral capsule 2020 take 1 capsule (15 mg) by oral route once daily at bedtime as needed for 30 days         Allergy List  Allergen Name Date Reaction Notes   No known history of drug allergy --  --  --        Allergies Reconciled  Family Medical History  Disease Name Relative/Age Notes   Esophagus Neoplasm, Malignant Father/65   Father  age 66   Colon Neoplasm, Malignant Uncle/   maternal uncle colon ca         Social History  Finding Status Start/Stop Quantity Notes   Alcohol Never --/-- --  does not drink   lives with parents --  --/-- --  --    Single --  --/-- --  --    Tobacco Former --/-- --  former smoker  04/10/2017 - 2017 - 2017 - 2017 - 2016 -    Working --  --/-- --  --          Immunizations  NameDate Admin Mfg Trade Name Lot Number Route Inj VIS Given VIS Publication   [id number]/22/2020 [employer] Fluzone Quadrivalent  NE NE 2020    Comments: date estimated   Rdguoyodk99/2018 [employer] Fluzone Quadrivalent  NE NE 10/01/2018    Comments:          Review of Systems  · Constitutional  o Admits  o : fatigue  o Denies  o :  "sick contacts, fever, chills, weakness  · Cardiovascular  o Denies  o : dypnea on exertion, pain in chest  · Respiratory  o Denies  o : shortness of breath, cough  · Gastrointestinal  o Denies  o : diarrhea, constipation  · Genitourinary  o Denies  o : urgency, frequency  · Neurologic  o Admits  o : headache      Vitals  Date Time BP Position Site L\R Cuff Size HR RR TEMP (F) WT  HT  BMI kg/m2 BSA m2 O2 Sat        06/23/2020 10:43 /74 Sitting    71 - R 18 98.8 140lbs 0oz 5'  5\" 23.3 1.71 97 %          Physical Examination  · Constitutional  o Appearance  o : well-nourished, well developed, alert, in no acute distress, well-tended appearance  · Head and Face  o Head  o :   § Inspection  § : atraumatic, normocephalic  · Eyes  o Eyes  o : extraocular movements intact, no scleral icterus, no conjunctival injection  · Respiratory  o Respiratory Effort  o : breathing comfortably, symmetric chest rise  o Auscultation of Lungs  o : clear to asculatation bilaterally, no wheezes, rales, or rhonchii  · Cardiovascular  o Heart  o :   § Auscultation of Heart  § : regular rate and rhythm, no murmurs, rubs, or gallops  o Peripheral Vascular System  o :   § Extremities  § : no edema  · Gastrointestinal  o Abdominal Examination  o :   § Abdomen  § : bowel sounds present, non-distended, non-tender  · Skin and Subcutaneous Tissue  o General Inspection  o : no lesions present, no areas of discoloration, skin turgor normal  · Neurologic  o Mental Status Examination  o :   § Orientation  § : grossly oriented to person, place and time  o Gait and Station  o :   § Gait Screening  § : normal gait  · Psychiatric  o General  o : normal mood and affect          Assessment  · Anxiety disorder     300.00/F41.9  · Essential hypertension     401.9/I10    Problems Reconciled  Plan  · Orders  o RADHA Report (KASPR) - 300.00/F41.9 - 06/23/2020  o ACO-39: Current medications updated and reviewed () - - " 06/23/2020  · Medications  o Medications have been Reconciled  o Transition of Care or Provider Policy  · Instructions  o Patient is taking medications as prescribed and doing well.   o Patient was educated/instructed on their diagnosis, treatment and medications prior to discharge from the clinic today.  o Call the office with any concerns or questions.  o Patient's blood pressure was likely elevated related to anxiety, and elevated that her anxiety better controlled her blood pressure is naturally responding. Advised patient to continue to monitor her blood pressure daily and to take the lisinopril. If it is greater than 140/90. Patient voiced understanding and all questions answered.  · Disposition  o Follow Up in 3 months     I will see Joi back in 3 months for routine follow-up and labs.    EMR dragon/transcription disclaimer: Much of this encounter note is an electronic transcription/translation of spoken language to printed text.  Electronic translation of spoken language may permit erroneous, or at times nonsensical words or phrases to be inadvertently transcribed; although I have reviewed the note for such errors, some may still exist.             Electronically Signed by: Tiarra Martinez APRN -Author on June 23, 2020 09:19:19 AM

## 2021-08-09 ENCOUNTER — TRANSCRIPTION ENCOUNTER (OUTPATIENT)
Age: 41
End: 2021-08-09

## 2021-08-10 ENCOUNTER — TRANSCRIPTION ENCOUNTER (OUTPATIENT)
Age: 41
End: 2021-08-10

## 2021-08-16 ENCOUNTER — TRANSCRIPTION ENCOUNTER (OUTPATIENT)
Age: 41
End: 2021-08-16

## 2021-08-20 ENCOUNTER — TRANSCRIPTION ENCOUNTER (OUTPATIENT)
Age: 41
End: 2021-08-20

## 2021-08-26 ENCOUNTER — TRANSCRIPTION ENCOUNTER (OUTPATIENT)
Age: 41
End: 2021-08-26

## 2021-08-30 ENCOUNTER — TRANSCRIPTION ENCOUNTER (OUTPATIENT)
Age: 41
End: 2021-08-30

## 2022-02-10 DIAGNOSIS — R53.83 OTHER FATIGUE: ICD-10-CM

## 2022-02-10 DIAGNOSIS — L68.9 HYPERTRICHOSIS, UNSPECIFIED: ICD-10-CM

## 2022-02-10 DIAGNOSIS — E55.9 VITAMIN D DEFICIENCY, UNSPECIFIED: ICD-10-CM

## 2022-02-10 DIAGNOSIS — K90.0 CELIAC DISEASE: ICD-10-CM

## 2022-02-10 DIAGNOSIS — L85.3 XEROSIS CUTIS: ICD-10-CM

## 2022-02-14 ENCOUNTER — APPOINTMENT (OUTPATIENT)
Dept: ENDOCRINOLOGY | Facility: CLINIC | Age: 42
End: 2022-02-14
Payer: MEDICAID

## 2022-02-14 DIAGNOSIS — E03.9 HYPOTHYROIDISM, UNSPECIFIED: ICD-10-CM

## 2022-02-14 PROCEDURE — 99212 OFFICE O/P EST SF 10 MIN: CPT | Mod: 95

## 2022-02-14 RX ORDER — LEVOTHYROXINE SODIUM 112 UG/1
112 CAPSULE ORAL DAILY
Qty: 30 | Refills: 1 | Status: DISCONTINUED | COMMUNITY
Start: 2020-11-16 | End: 2022-02-14

## 2022-02-14 RX ORDER — LIOTHYRONINE SODIUM 5 UG/1
5 TABLET ORAL DAILY
Qty: 60 | Refills: 2 | Status: DISCONTINUED | COMMUNITY
Start: 2020-11-16 | End: 2022-02-14

## 2022-02-14 NOTE — REASON FOR VISIT
[Home] : at home, [unfilled] , at the time of the visit. [Medical Office: ([facility])___] : at the medical office located in  [Verbal consent obtained from patient] : the patient, [unfilled] [Follow - Up] : a follow-up visit [Hypothyroidism] : hypothyroidism

## 2022-02-24 NOTE — ADDENDUM
[FreeTextEntry1] : 2/24/2022 [place]\par Reviewed complete lab results, which were normal except the slightly low free T4 which had been previously discussed. See scanned results.\par \par Expedited appeal faxed for [place].

## 2022-02-24 NOTE — ASSESSMENT
[FreeTextEntry1] : Hypothyroidism.\par TFTs basically stable, but free T4 drifting to 0.8. Will increase armour to 75mg. Monitor for palpitations (experienced in the past).\par Repeat TFTs 6-8 weeks.\par Thyroid US ordered.\par \par Unremarkable lab results for w/u of additional symptoms. Some labs still pending. She will email me results once complete.\par \par RTO - next visit should be in person as she has not been seen in over 1 year. Lab req and US order mailed to pt.\par \par  \par

## 2022-02-24 NOTE — HISTORY OF PRESENT ILLNESS
[FreeTextEntry1] : Ms. Tim is a 41 year-old woman with a history of celiac disease and Hashimoto's hypothyroidism presenting for f/u of hypothyroidism. Last see in person July 2020; have had telehelath appts in the interim.\par \par Today she complains of vaginal pruritus since Oct that is worse at night. She has seen OBGYN and is awaiting test results. \par She complains of fatigue, difficulty seeping and awakening, increased thirst, increased dry skin, dry hair, and new dark black hairs to face. \par LMP 2/7/2022 - follicular\par \par Hypothyroidism.\par She was diagnosed in 2000.\par She has been taking [place] thyroid 67.5 mg per day and has been for past year.\par She is now taking [place] at night, and had some symptom relief at the beginning. \par Labs -- \par May 2019: TSH 0.70, FT4 0.7, TT4 5.1, TT3 29, FT3 3.3. Reported labs from May 2020: TSH 0.5mI u/l (0.27-4.2), and thyroxine 9.1 pmol (12.0-22).\par Sept 2020: TSH low 0.32, free T4 0.7, Total T3 82. TSI and TG neg.\par We decreased armour from 67.5mg to 60mg daily.\par Nov 2020: TSH 0.15, free T4 0.8 (from 0.9), and Total T3 111 (from 82). TSH possibly lower due to current infection (euthyroid sick). Also treatment with desiccated thyroid is difficult to interpret lab values, as there is no set amount of t3 and t4 in each pill. We discussed changing to levothyroxine 112mcg and cytomel 10mcg as long as formulation is gluten and dairy free. Will investigate. For now, increase armour back to 67.5mg.\par May 2021: TSH 0.83, TT3 118, free T4 0.9. Continued armour 67.5mg.\par Feb 2022: TSH 0.4, free T3 3.1, free T4 0.8.\par Thyroid US - January 2017. No nodules and unremarkable. See scanned.\par Previously she was taking WP Thyroid for about 3 years, at the 81.25 mg dose since about September 2017. WP Thyroid had been very difficult to obtain from pharmacies. Prior to that, she was previously on brand-name Synthroid and had subsequently felt much better since the switch to a natural formulation. She had a reaction to levothyroxine in the past.\par No history of head/neck radiation.\par Her paternal aunt has a history of hypothyroidism.\par \par She takes the following supplements: homocysteine supreme, digestive enzymes, fish oil, probiotics, Vitamin C and D.\par

## 2022-05-16 NOTE — ED ADULT NURSE NOTE - DISCHARGE DATE/TIME
REASON FOR CONSULTATION/CC: copd vs asthma      Consult at request of  Franky Augustine MD for shortness of breath     PCP: Franky Augustine MD    HISTORY OF PRESENT ILLNESS: Juli Elizondo is a 80y.o. year old female with a history of asthma/copd who presents :     History  Patient diagnosed with COPD with a pulmonary function test 2017 demonstrating FEV1 64%, bronchodilator response, DLCO 63%. Current history        CHF  Was recently increased fluid. Changed lasix. COPD  Trelegy  albuterol  No issues with cost or side effects   Rare nebulizer use    Allegereis   Singulair and Claritin     positive for covid ~ 10 days ago. Objective:   PHYSICAL EXAM:  Blood pressure 120/60, pulse 55, temperature 97.7 °F (36.5 °C), temperature source Infrared, resp. rate 16, height 5' (1.524 m), weight 137 lb (62.1 kg), SpO2 94 %, not currently breastfeeding.'    Gen: No distress. Eyes:    ENT:    Neck:    Resp: No accessory muscle use. No crackles. No wheezes. No rhonchi. CV: Regular rate. Regular rhythm. No murmur or rub. No edema. GI:    Skin:    Lymph:    M/S: No cyanosis. No clubbing. Neuro: Moves all four extremities. Psych: Oriented x 3. No anxiety. Awake. Alert. Intact judgement and insight.         Data Reviewed:        Assessment:       · Chronic bronchitis versus asthmatic bronchitis  · Chronic rhinitis    Plan:            Problem List Items Addressed This Visit     Other allergic rhinitis      Siingulair / Claritin          COPD, mild ([address]      Continues to be controlled    Trelegy / albuterol
12-Sep-2020 16:07

## 2022-10-17 ENCOUNTER — RX RENEWAL (OUTPATIENT)
Age: 42
End: 2022-10-17

## 2022-10-17 RX ORDER — THYROID, PORCINE 15 MG/1
15 TABLET ORAL DAILY
Qty: 90 | Refills: 1 | Status: ACTIVE | COMMUNITY
Start: 2019-08-19 | End: 1900-01-01

## 2022-10-17 RX ORDER — THYROID, PORCINE 60 MG/1
60 TABLET ORAL DAILY
Qty: 90 | Refills: 1 | Status: ACTIVE | COMMUNITY
Start: 2019-08-19 | End: 1900-01-01

## 2023-02-28 NOTE — ED PROVIDER NOTE - NS_EDPROVIDERDISPOUSERTYPE_ED_A_ED
Initial Anesthesia Post-op Note    Patient: Mateusz Hein  Procedure(s) Performed: DILATION, ESOPHAGUS, ENDOSCOPIC  Anesthesia type: MAC        Patient Location: PACU Phase 1  Post-op Vital Signs:stable  Level of Consciousness: responds to stimulation  Respiratory Status: spontaneous ventilation and unassisted  Cardiovascular blood pressure returned to baseline  Hydration: euvolemic  Pain Management: adequately controlled  Handoff: Handoff to receiving nurse was performed and questions were answered  Nausea: None  Airway Patency:patent  Post-op Assessment: patient tolerated procedure well with no complications  Comments: , Supplemental O2 as needed. VSS, report to gi RN.      No notable events documented.   Attending Attestation (For Attendings USE Only)...

## 2024-01-08 NOTE — ED ADULT TRIAGE NOTE - PAIN RATING/NUMBER SCALE (0-10): REST
MRN#:  [id number]  Robyn Sotelo  [address]   [address]  [facility] [address]      Patient will be setting up for Egd  and would like to know if patient can hold Trulicity  7 days prior?  Please advise.    If you have any questions about any of the instructions above about your procedure, please call the office at [phone].   8

## 2024-05-02 NOTE — ED ADULT NURSE NOTE - CAS TRG GEN SKIN CONDITION
1. Rest your neck and avoid overuse   2. Apply heat or ice to muscles, whichever feels better  3. Take naproxen as prescribed WITH FOOD  4. Do not take any other nsaid while on naproxen (i.e. NO ibuprofen, motrin, advil, aleve, aspirin) as these have additive effects  5. Take tylenol as needed  6. Take flexeril as prescribed. Take caution while on this medication. Do not drive or operate machinery as this medication may make you drowsy  7. Follow up with primary care within 1 week  8. Seek immediate attention in ER for worsening of symptoms, loss of sensation, decreased range of motrion, bowel or bladder function loss.     Warm

## 2024-09-11 NOTE — ED ADULT NURSE NOTE - NSFALLRSKASSESSDT_ED_ALL_ED
Quality 431: Preventive Care And Screening: Unhealthy Alcohol Use - Screening: Patient not identified as an unhealthy alcohol user when screened for unhealthy alcohol use using a systematic screening method
Quality 226: Preventive Care And Screening: Tobacco Use: Screening And Cessation Intervention: Patient screened for tobacco use and is an ex/non-smoker
Detail Level: Detailed
Quality 130: Documentation Of Current Medications In The Medical Record: Current Medications Documented
05-Sep-2020 14:48

## 2024-11-12 NOTE — ED ADULT TRIAGE NOTE - NS ED NURSE DIRECT TO ROOM YN
No Quality 226: Preventive Care And Screening: Tobacco Use: Screening And Cessation Intervention: Patient screened for tobacco use and is an ex/non-smoker Quality 47: Advance Care Plan: Advance Care Planning discussed and documented; advance care plan or surrogate decision maker documented in the medical record. Detail Level: Detailed